# Patient Record
Sex: FEMALE | Race: WHITE | NOT HISPANIC OR LATINO | Employment: OTHER | URBAN - METROPOLITAN AREA
[De-identification: names, ages, dates, MRNs, and addresses within clinical notes are randomized per-mention and may not be internally consistent; named-entity substitution may affect disease eponyms.]

---

## 2018-03-01 ENCOUNTER — HOSPITAL ENCOUNTER (OUTPATIENT)
Dept: OUTPATIENT SERVICES | Facility: HOSPITAL | Age: 73
Discharge: HOME | End: 2018-03-01
Attending: RADIOLOGY | Admitting: RADIOLOGY

## 2018-03-20 ENCOUNTER — HOSPITAL ENCOUNTER (OUTPATIENT)
Dept: RADIATION ONCOLOGY | Facility: HOSPITAL | Age: 73
Setting detail: RADIATION/ONCOLOGY SERIES
Discharge: HOME | End: 2018-03-20
Attending: RADIOLOGY
Payer: COMMERCIAL

## 2018-03-20 VITALS — WEIGHT: 194 LBS

## 2018-03-20 PROBLEM — C50.912 MALIGNANT NEOPLASM OF LEFT BREAST IN FEMALE, ESTROGEN RECEPTOR POSITIVE (CMS/HCC): Status: ACTIVE | Noted: 2018-03-20

## 2018-03-20 PROBLEM — Z17.0 MALIGNANT NEOPLASM OF LEFT BREAST IN FEMALE, ESTROGEN RECEPTOR POSITIVE (CMS/HCC): Status: ACTIVE | Noted: 2018-03-20

## 2018-03-20 NOTE — PROGRESS NOTES
Patient ID:  Maria Elena Lowry is a 72 y.o. female.  Referring Physician:   Steven Galdamez DO  [address] [place] [id number]    Primary Care Provider:  Steven Galdamez DO    Problem List:  Patient Active Problem List    Diagnosis Date Noted   • Malignant neoplasm of left breast in female, estrogen receptor positive (CMS/HCC) (HCC) 03/20/2018        Cancer Staging Information:  Cancer Staging  No matching staging information was found for the patient.    Subjective      History of Present Illness:  The following portions of the patient's history were reviewed and updated as appropriate: allergies, current medications, past family history, past medical history, past social history, past surgical history and problem list.   HPI   Review of Systems - Oncology     Maria Elena returns for routine follow-up and is feeling relatively well.  She notes nice resolution of radiation side effects to the left breast.  Will be seeing Dr. Mcneal soon to initiate antiestrogen therapy    Objective      Physical Exam:  Vital Signs for this encounter:  Weight: 88 kg (194 lb) (03/20 1108)    Physical Exam   On exam, there is nice resolution of erythema and induration of the left breast.  Diffuse hyperpigmentation persists with areas of patchy dry desquamation.  Her cosmesis is excellent.    Performance Status:     PAIN ASSESSMENT:  Score    Location    Pain Intervention      LABS:  No results found for this or any previous visit (from the past 336 hour(s)).       Assessment/Plan   Maria Elena has recovered very nicely from her adjuvant breast radiation.    Diagnoses and Orders Associated With This Visit:  There are no diagnoses linked to this encounter.  TREATMENT PLAN SUMMARY:  [No treatment plan]    PO CHEMOTHERAPY:  Antiestrogen therapy per medical oncology         THERAPY PLAN:    Continue skin care protocol, initiate antiestrogen therapy, and hands healthy lifestyle, follow a program of close surveillance.  I will see Maria Elena in 9  months.  She is scheduled follow-up with her other physicians in the interim.

## 2018-05-17 DIAGNOSIS — Z85.3 HISTORY OF LEFT BREAST CANCER: Primary | ICD-10-CM

## 2018-05-25 ENCOUNTER — OFFICE VISIT (OUTPATIENT)
Dept: SURGERY | Facility: CLINIC | Age: 73
End: 2018-05-25
Payer: COMMERCIAL

## 2018-05-25 ENCOUNTER — HOSPITAL ENCOUNTER (OUTPATIENT)
Dept: RADIOLOGY | Facility: HOSPITAL | Age: 73
Discharge: HOME | End: 2018-05-25
Attending: SURGERY
Payer: COMMERCIAL

## 2018-05-25 VITALS
WEIGHT: 204 LBS | SYSTOLIC BLOOD PRESSURE: 176 MMHG | HEART RATE: 63 BPM | HEIGHT: 62 IN | DIASTOLIC BLOOD PRESSURE: 89 MMHG | BODY MASS INDEX: 37.54 KG/M2

## 2018-05-25 DIAGNOSIS — Z85.3 HISTORY OF BREAST CANCER: Primary | ICD-10-CM

## 2018-05-25 DIAGNOSIS — Z85.3 HISTORY OF LEFT BREAST CANCER: ICD-10-CM

## 2018-05-25 PROCEDURE — 99213 OFFICE O/P EST LOW 20 MIN: CPT | Performed by: SURGERY

## 2018-05-25 PROCEDURE — 77065 DX MAMMO INCL CAD UNI: CPT | Mod: LT

## 2018-05-25 ASSESSMENT — ENCOUNTER SYMPTOMS
ENDOCRINE NEGATIVE: 1
CARDIOVASCULAR NEGATIVE: 1
NEUROLOGICAL NEGATIVE: 1
CONSTITUTIONAL NEGATIVE: 1
GASTROINTESTINAL NEGATIVE: 1
MUSCULOSKELETAL NEGATIVE: 1
HEMATOLOGIC/LYMPHATIC NEGATIVE: 1
PSYCHIATRIC NEGATIVE: 1
RESPIRATORY NEGATIVE: 1
ALLERGIC/IMMUNOLOGIC NEGATIVE: 1

## 2018-05-25 NOTE — PROGRESS NOTES
Patient ID: Maria Elena Lowry                              : 1945  MRN: [id number]                                            Visit Date: 2018  Encounter Provider: Jose Luis Burgess  Referring Provider: Steven Galdamez DO Subjective   Chief Complaint: Breast Check (3 months follow up)    Maria Elena Lowry is a 72 y.o. old female presenting today for follow up for her recently treated left breast cancer.  She is status post left lumpectomy sentinel node biopsy for T1 N0 M0 stage I breast carcinoma.  She is on Arimidex.  She received whole breast radiation.  She had a mammogram today which we will require six-month follow-up on the left side..      Medications:   Current Outpatient Prescriptions:   •  cholecalciferol, vitamin D3, (cholecalciferol) 400 unit tablet tablet, Take by mouth., Disp: , Rfl:   •  multivitamin-min-iron-FA-vit K (MULTI FOR HER) 18 mg iron-600 mcg-40 mcg capsule, Take by mouth., Disp: , Rfl:     Past Medical History:  has a past medical history of Breast cancer (CMS/HCC) (HCC) and Estrogen receptor positive status (ER+).  Past Surgical History:  has a past surgical history that includes Breast biopsy; Breast lumpectomy (Left); [place] lymph node biopsy; Ankle ligament reconstruction (Right); and Tonsillectomy.  Social History:  reports that she has never smoked. She has never used smokeless tobacco. She reports that she drinks alcohol. She reports that she does not use drugs.  Family History: family history includes Hypertension in her father.  Allergies: is allergic to no known drug allergies.     Review of Systems   Constitutional: Negative.    HENT: Negative.    Respiratory: Negative.    Cardiovascular: Negative.    Gastrointestinal: Negative.    Endocrine: Negative.    Genitourinary: Negative.    Musculoskeletal: Negative.    Skin: Negative.    Allergic/Immunologic: Negative.    Neurological: Negative.    Hematological: Negative.    Psychiatric/Behavioral: Negative.      The  "following have been reviewed and updated as appropriate in this visit:         Objective   Vitals: BP (!) 176/89 (BP Location: Right forearm, Patient Position: Sitting)   Pulse 63   Ht 1.575 m (5' 2\")   Wt 92.5 kg (204 lb)   Breastfeeding? No   BMI 37.31 kg/m²   Physical Exam  Physical Exam   Constitutional: She is oriented to person, place, and time. She appears well-developed and well-nourished.   Eyes: Pupils are equal, round, and reactive to light.   Neck: Normal range of motion. Neck supple.   Cardiovascular: Normal rate, regular rhythm, normal heart sounds and intact distal pulses.    Pulmonary/Chest: Effort normal and breath sounds normal.   Abdominal: Soft. Bowel sounds are normal.   Neurological: She is alert and oriented to person, place, and time.   Skin: Skin is warm and dry.   Psychiatric: She has a normal mood and affect. Her behavior is normal. Judgment and thought content normal.   Breasts- there are no masses in either breast, no retractions skin or nipple, no nipple discharge could be demonstrated, there is no evidence of axillary supraclavicular adenopathy.       Assessment/Plan   Problem List Items Addressed This Visit     None      Visit Diagnoses     History of breast cancer    -  Primary        Status post left lumpectomy sentinel node biopsy.  Clinically ANNA at 7 months.  I will see her back in 3 months time.  She will require a left mammogram in November  Return in about 3 months (around 8/25/2018).       Anna Burgess MD  "

## 2018-08-30 ENCOUNTER — OFFICE VISIT (OUTPATIENT)
Dept: SURGERY | Facility: CLINIC | Age: 73
End: 2018-08-30
Payer: COMMERCIAL

## 2018-08-30 ENCOUNTER — TELEPHONE (OUTPATIENT)
Dept: SURGERY | Facility: CLINIC | Age: 73
End: 2018-08-30

## 2018-08-30 VITALS
BODY MASS INDEX: 37.54 KG/M2 | SYSTOLIC BLOOD PRESSURE: 166 MMHG | WEIGHT: 204 LBS | HEART RATE: 70 BPM | HEIGHT: 62 IN | DIASTOLIC BLOOD PRESSURE: 90 MMHG | TEMPERATURE: 98.3 F

## 2018-08-30 DIAGNOSIS — C50.912 MALIGNANT NEOPLASM OF LEFT FEMALE BREAST, UNSPECIFIED ESTROGEN RECEPTOR STATUS, UNSPECIFIED SITE OF BREAST (CMS/HCC): Primary | ICD-10-CM

## 2018-08-30 DIAGNOSIS — C50.912 MALIGNANT NEOPLASM OF LEFT BREAST IN FEMALE, ESTROGEN RECEPTOR POSITIVE, UNSPECIFIED SITE OF BREAST (CMS/HCC): Primary | ICD-10-CM

## 2018-08-30 DIAGNOSIS — Z17.0 MALIGNANT NEOPLASM OF LEFT BREAST IN FEMALE, ESTROGEN RECEPTOR POSITIVE, UNSPECIFIED SITE OF BREAST (CMS/HCC): Primary | ICD-10-CM

## 2018-08-30 DIAGNOSIS — Z85.3 HISTORY OF BREAST CANCER: ICD-10-CM

## 2018-08-30 PROCEDURE — 99213 OFFICE O/P EST LOW 20 MIN: CPT | Performed by: SURGERY

## 2018-08-30 RX ORDER — ACETAMINOPHEN AND CODEINE PHOSPHATE 300; 30 MG/1; MG/1
TABLET ORAL AS NEEDED
COMMUNITY
Start: 2017-11-03 | End: 2020-11-25 | Stop reason: ENTERED-IN-ERROR

## 2018-08-30 ASSESSMENT — ENCOUNTER SYMPTOMS
CARDIOVASCULAR NEGATIVE: 1
ALLERGIC/IMMUNOLOGIC NEGATIVE: 1
HEMATOLOGIC/LYMPHATIC NEGATIVE: 1
GASTROINTESTINAL NEGATIVE: 1
ENDOCRINE NEGATIVE: 1
RESPIRATORY NEGATIVE: 1
NEUROLOGICAL NEGATIVE: 1
MUSCULOSKELETAL NEGATIVE: 1
PSYCHIATRIC NEGATIVE: 1
CONSTITUTIONAL NEGATIVE: 1

## 2018-08-30 NOTE — PROGRESS NOTES
Patient ID: Maria Elena Lowry                              : 1945  MRN: [id number]                                            Visit Date: 2018  Encounter Provider: Jose Luis Burgess  Referring Provider: No ref. provider found    Subjective   Chief Complaint: Follow-up (Hx of breast cancer)    Maria Elena Lowry is a 73 y.o. old female presenting today for follow-up of her recently treated left breast cancer.  Status post left lumpectomy and radiation for a T1 N0 M0 stage I infiltrating ductal breast carcinoma.. Is here for an evaluation.  She had a mammogram 3 months ago was category 3 which require follow-up left mammogram in 3 months.  She has no major complaints at this time.     Medications:   Current Outpatient Prescriptions:   •  acetaminophen-codeine (TYLENOL #3) 300-30 mg per tablet, as needed., Disp: , Rfl:   •  cholecalciferol, vitamin D3, (cholecalciferol) 400 unit tablet tablet, Take by mouth., Disp: , Rfl:   •  multivitamin-min-iron-FA-vit K (MULTI FOR HER) 18 mg iron-600 mcg-40 mcg capsule, Take by mouth., Disp: , Rfl:     Past Medical History:  has a past medical history of Breast cancer (CMS/HCC) (HCC) and Estrogen receptor positive status (ER+).  Past Surgical History:  has a past surgical history that includes Breast biopsy; Breast lumpectomy (Left); [place] lymph node biopsy; Ankle ligament reconstruction (Right); and Tonsillectomy.  Social History:  reports that she has never smoked. She has never used smokeless tobacco. She reports that she drinks alcohol. She reports that she does not use drugs.  Family History: family history includes Hypertension in her father.  Allergies: is allergic to no known drug allergies.     Review of Systems   Constitutional: Negative.    HENT: Negative.    Respiratory: Negative.    Cardiovascular: Negative.    Gastrointestinal: Negative.    Endocrine: Negative.    Genitourinary: Negative.    Musculoskeletal: Negative.    Skin: Negative.   "  Allergic/Immunologic: Negative.    Neurological: Negative.    Hematological: Negative.    Psychiatric/Behavioral: Negative.      The following have been reviewed and updated as appropriate in this visit:         Objective   Vitals: BP (!) 166/90 (BP Location: Right forearm, Patient Position: Sitting)   Pulse 70   Temp 36.8 °C (98.3 °F) (Temporal)   Ht 1.575 m (5' 2\")   Wt 92.5 kg (204 lb)   BMI 37.31 kg/m²   Physical Exam      Physical Exam   Constitutional: She is oriented to person, place, and time. She appears well-developed and well-nourished.   Eyes: Pupils are equal, round, and reactive to light.   Neck: Normal range of motion. Neck supple.   Cardiovascular: Normal rate, regular rhythm, normal heart sounds and intact distal pulses.    Pulmonary/Chest: Effort normal and breath sounds normal.   Abdominal: Soft. Bowel sounds are normal.   Neurological: She is alert and oriented to person, place, and time.   Skin: Skin is warm and dry.   Psychiatric: She has a normal mood and affect. Her behavior is normal. Judgment and thought content normal.   Breasts- there are no masses in either breast, no retractions skin or nipple, no nipple discharge could be demonstrated, there is no evidence of axillary supraclavicular adenopathy.    Assessment/Plan   Problem List Items Addressed This Visit     Malignant neoplasm of left breast in female, estrogen receptor positive (CMS/HCC) (HCC) - Primary      Other Visit Diagnoses     History of breast cancer            She had a normal physical exam.  She will get a left mammogram in 3 months time and I will see her back at that time.  She is clinically ANNA at 10 months.  Return in about 3 months (around 11/30/2018).       Anna Burgess MD  "

## 2018-11-15 ENCOUNTER — LAB REQUISITION (OUTPATIENT)
Dept: LAB | Facility: HOSPITAL | Age: 73
End: 2018-11-15
Attending: INTERNAL MEDICINE
Payer: COMMERCIAL

## 2018-11-15 DIAGNOSIS — C50.912 MALIGNANT NEOPLASM OF LEFT FEMALE BREAST (CMS/HCC): ICD-10-CM

## 2018-11-15 LAB
BASOPHILS # BLD: 0.08 K/UL (ref 0.01–0.1)
BASOPHILS NFR BLD: 1 %
DIFFERENTIAL METHOD BLD: NORMAL
EOSINOPHIL # BLD: 0.13 K/UL (ref 0.04–0.36)
EOSINOPHIL NFR BLD: 1.5 %
ERYTHROCYTE [DISTWIDTH] IN BLOOD BY AUTOMATED COUNT: 12.7 % (ref 11.7–14.4)
HCT VFR BLDCO AUTO: 38.1 % (ref 35–45)
HGB BLD-MCNC: 12.6 G/DL (ref 11.8–15.7)
IMM GRANULOCYTES # BLD AUTO: 0.01 K/UL (ref 0–0.08)
IMM GRANULOCYTES NFR BLD AUTO: 0.1 %
LYMPHOCYTES # BLD: 1.99 K/UL (ref 1.2–3.5)
LYMPHOCYTES NFR BLD: 23.6 %
MCH RBC QN AUTO: 30.6 PG (ref 28–33.2)
MCHC RBC AUTO-ENTMCNC: 33.1 G/DL (ref 32.2–35.5)
MCV RBC AUTO: 92.5 FL (ref 83–98)
MONOCYTES # BLD: 0.69 K/UL (ref 0.28–0.8)
MONOCYTES NFR BLD: 8.2 %
NEUTROPHILS # BLD: 5.52 K/UL (ref 1.7–7)
NEUTS SEG NFR BLD: 65.6 %
NRBC BLD-RTO: 0 %
PDW BLD AUTO: 9.2 FL (ref 9.4–12.3)
PLATELET # BLD AUTO: 433 K/UL (ref 150–369)
RBC # BLD AUTO: 4.12 M/UL (ref 3.93–5.22)
WBC # BLD AUTO: 8.42 K/UL (ref 3.8–10.5)

## 2018-11-15 PROCEDURE — 85025 COMPLETE CBC W/AUTO DIFF WBC: CPT | Performed by: INTERNAL MEDICINE

## 2018-11-15 PROCEDURE — 36415 COLL VENOUS BLD VENIPUNCTURE: CPT | Performed by: INTERNAL MEDICINE

## 2018-11-29 ENCOUNTER — HOSPITAL ENCOUNTER (OUTPATIENT)
Dept: RADIOLOGY | Facility: HOSPITAL | Age: 73
Discharge: HOME | End: 2018-11-29
Attending: SURGERY
Payer: COMMERCIAL

## 2018-11-29 DIAGNOSIS — C50.912 MALIGNANT NEOPLASM OF LEFT FEMALE BREAST, UNSPECIFIED ESTROGEN RECEPTOR STATUS, UNSPECIFIED SITE OF BREAST (CMS/HCC): ICD-10-CM

## 2018-11-29 DIAGNOSIS — Z17.0 MALIGNANT NEOPLASM OF LEFT BREAST IN FEMALE, ESTROGEN RECEPTOR POSITIVE, UNSPECIFIED SITE OF BREAST (CMS/HCC): Primary | ICD-10-CM

## 2018-11-29 DIAGNOSIS — C50.912 MALIGNANT NEOPLASM OF LEFT BREAST IN FEMALE, ESTROGEN RECEPTOR POSITIVE, UNSPECIFIED SITE OF BREAST (CMS/HCC): Primary | ICD-10-CM

## 2018-11-29 PROCEDURE — 77066 DX MAMMO INCL CAD BI: CPT

## 2018-12-11 ENCOUNTER — OFFICE VISIT (OUTPATIENT)
Dept: SURGERY | Facility: CLINIC | Age: 73
End: 2018-12-11
Payer: COMMERCIAL

## 2018-12-11 VITALS
HEART RATE: 70 BPM | SYSTOLIC BLOOD PRESSURE: 160 MMHG | WEIGHT: 204 LBS | TEMPERATURE: 97.8 F | BODY MASS INDEX: 37.54 KG/M2 | DIASTOLIC BLOOD PRESSURE: 83 MMHG | HEIGHT: 62 IN

## 2018-12-11 DIAGNOSIS — Z85.3 HISTORY OF BREAST CANCER: Primary | ICD-10-CM

## 2018-12-11 PROCEDURE — 99213 OFFICE O/P EST LOW 20 MIN: CPT | Performed by: SURGERY

## 2018-12-11 RX ORDER — ANASTROZOLE 1 MG/1
1 TABLET ORAL DAILY
Refills: 0 | Status: ON HOLD | COMMUNITY
Start: 2018-11-13 | End: 2021-03-16 | Stop reason: SDUPTHER

## 2018-12-11 ASSESSMENT — ENCOUNTER SYMPTOMS
NEUROLOGICAL NEGATIVE: 1
MUSCULOSKELETAL NEGATIVE: 1
PSYCHIATRIC NEGATIVE: 1
CONSTITUTIONAL NEGATIVE: 1
HEMATOLOGIC/LYMPHATIC NEGATIVE: 1
ALLERGIC/IMMUNOLOGIC NEGATIVE: 1
GASTROINTESTINAL NEGATIVE: 1
CARDIOVASCULAR NEGATIVE: 1
RESPIRATORY NEGATIVE: 1
ENDOCRINE NEGATIVE: 1

## 2018-12-11 NOTE — PROGRESS NOTES
Patient ID: Maria Elena Lowry                              : 1945  MRN: [id number]                                            Visit Date: 2018  Encounter Provider: Jose Luis Burgess  Referring Provider: No ref. provider found    Subjective   Chief Complaint: Breast Check (4 months f/u - mammigram done on 18)    Maria Elena Lowry is a 73 y.o. old female presenting today for previous history of left breast cancer.  She is status post left lumpectomy and sentinel node biopsy.  1N0M0 stage I intraductal breast carcinoma.  She received whole breast radiation and is currently on Arimidex.  She did bilateral mammogram 2 weeks ago which showed nothing high risk and no major complaints this time.     Medications:   Current Outpatient Prescriptions:   •  anastrozole (ARIMIDEX) 1 mg tablet, , Disp: , Rfl: 0  •  cholecalciferol, vitamin D3, (cholecalciferol) 400 unit tablet tablet, Take by mouth., Disp: , Rfl:   •  multivitamin-min-iron-FA-vit K (MULTI FOR HER) 18 mg iron-600 mcg-40 mcg capsule, Take by mouth., Disp: , Rfl:   •  acetaminophen-codeine (TYLENOL #3) 300-30 mg per tablet, as needed., Disp: , Rfl:     Past Medical History:  has a past medical history of Breast cancer (CMS/HCC) (HCC) and Estrogen receptor positive status (ER+).  Past Surgical History:  has a past surgical history that includes Breast biopsy; Breast lumpectomy (Left); [place] lymph node biopsy; Ankle ligament reconstruction (Right); and Tonsillectomy.  Social History:  reports that she has never smoked. She has never used smokeless tobacco. She reports that she drinks alcohol. She reports that she does not use drugs.  Family History: family history includes Hypertension in her father.  Allergies: is allergic to no known drug allergies.     Review of Systems   Constitutional: Negative.    HENT: Negative.    Respiratory: Negative.    Cardiovascular: Negative.    Gastrointestinal: Negative.    Endocrine: Negative.    Genitourinary:  "Negative.    Musculoskeletal: Negative.    Skin: Negative.    Allergic/Immunologic: Negative.    Neurological: Negative.    Hematological: Negative.    Psychiatric/Behavioral: Negative.      The following have been reviewed and updated as appropriate in this visit:         Objective   Vitals: BP (!) 160/83 (BP Location: Left forearm, Patient Position: Sitting)   Pulse 70   Temp 36.6 °C (97.8 °F) (Temporal)   Ht 1.575 m (5' 2\")   Wt 92.5 kg (204 lb)   BMI 37.31 kg/m²   Physical Exam  Physical Exam   Constitutional: She is oriented to person, place, and time. She appears well-developed and well-nourished.   Eyes: Pupils are equal, round, and reactive to light.   Neck: Normal range of motion. Neck supple.   Cardiovascular: Normal rate, regular rhythm, normal heart sounds and intact distal pulses.    Pulmonary/Chest: Effort normal and breath sounds normal.   Abdominal: Soft. Bowel sounds are normal.   Neurological: She is alert and oriented to person, place, and time.   Skin: Skin is warm and dry.   Psychiatric: She has a normal mood and affect. Her behavior is normal. Judgment and thought content normal.   Breasts- there are no masses in either breast, no retractions skin or nipple, no nipple discharge could be demonstrated, there is no evidence of axillary supraclavicular adenopathy.       Assessment/Plan   Problem List Items Addressed This Visit     None      Visit Diagnoses     History of breast cancer    -  Primary        She had a normal mammogram annual physical exam.  Clinically in the 1 year.  As far as follow-up months of breast examination by physician twice a repeat in 1 years time.  Return in about 3 months (around 3/11/2019).       Jose Luis Burgess MD  "

## 2018-12-20 ENCOUNTER — HOSPITAL ENCOUNTER (OUTPATIENT)
Dept: RADIATION ONCOLOGY | Facility: HOSPITAL | Age: 73
Setting detail: RADIATION/ONCOLOGY SERIES
Discharge: HOME | End: 2018-12-20
Attending: RADIOLOGY
Payer: COMMERCIAL

## 2018-12-20 VITALS — WEIGHT: 205 LBS | BODY MASS INDEX: 37.49 KG/M2

## 2018-12-20 DIAGNOSIS — Z17.0 MALIGNANT NEOPLASM OF LEFT BREAST IN FEMALE, ESTROGEN RECEPTOR POSITIVE, UNSPECIFIED SITE OF BREAST (CMS/HCC): Primary | ICD-10-CM

## 2018-12-20 DIAGNOSIS — C50.912 MALIGNANT NEOPLASM OF LEFT BREAST IN FEMALE, ESTROGEN RECEPTOR POSITIVE, UNSPECIFIED SITE OF BREAST (CMS/HCC): Primary | ICD-10-CM

## 2018-12-20 ASSESSMENT — PAIN SCALES - GENERAL: PAINLEVEL: 0-NO PAIN

## 2018-12-20 NOTE — PROGRESS NOTES
Consult Note      Steven Galdamez, Steven Osorio, DO  Patient Care Team     Relationship Specialty Notifications Start End   Steven Galdamez, DO PCP - General   12/30/17    Celi Liao MD Radiation Oncologist Radiation Oncology  11/28/18        Subjective     Maria Elena Lowry is a 73 y.o. female who was referred for Cancer Staging  No matching staging information was found for the patient.        HPI: Maria Elena returns for follow up for treatment of her left breast cancer. She is taking Arimidex which she is tolerating well however she blames weight gain on the medicine.     She had a mammogram on 5/25 that showed only post op changes in her left breast. She saw Dr. Burgess at that time. She saw Dr. Mcneal last month. She takes calcium with vitamin D. 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     Medical History:   Past Medical History:   Diagnosis Date   • Breast cancer (CMS/HCC) (HCC)     Left   • Estrogen receptor positive status (ER+)        Surgical History:   Past Surgical History:   Procedure Laterality Date   • ANKLE LIGAMENT RECONSTRUCTION Right    • BREAST BIOPSY     • BREAST LUMPECTOMY Left    • SENTINEL LYMPH NODE BIOPSY     • TONSILLECTOMY          Allergies: No known drug allergies      Current Outpatient Prescriptions:   •  acetaminophen-codeine (TYLENOL #3) 300-30 mg per tablet, as needed., Disp: , Rfl:   •  anastrozole (ARIMIDEX) 1 mg tablet, , Disp: , Rfl: 0  •  cholecalciferol, vitamin D3, (cholecalciferol) 400 unit tablet tablet, Take by mouth., Disp: , Rfl:   •  multivitamin-min-iron-FA-vit K (MULTI FOR HER) 18 mg iron-600 mcg-40 mcg capsule, Take by mouth., Disp: , Rfl:      Social History:   Social History     Social History   • Marital status: Single     Spouse name: N/A   • Number of children: N/A   • Years of education: N/A     Social History Main Topics   • Smoking status: Never Smoker   • Smokeless tobacco: Never Used   • Alcohol use Yes   • Drug use: No   • Sexual  activity: Not on file     Other Topics Concern   • Not on file     Social History Narrative   • No narrative on file       Family History:   Family History   Problem Relation Age of Onset   • Hypertension Father        Objective       Physical Exam   General appearance: alert, appears stated age and cooperative  Head: normocephalic, without obvious abnormality, atraumatic  Chest wall: The left breast is significant for a well healing incision with minimal associated smooth fibrosis.  She has an excellent cosmesis.  The right breast is negative.  She does have some pigmented moles throughout, but including in the breast region.  Extremities: extremities normal, warm and well-perfused; no cyanosis, clubbing, or edema  Lymph nodes: Axillary, cervical and supraclavicular nodes normal.  Neurologic: Grossly normal      CBC Results       11/15/18 10/27/17                       1155 0837          WBC 8.42 8.90          RBC 4.12 4.23          HGB 12.6 12.8          HCT 38.1 39.1          MCV 92.5 92.4          MCH 30.6 30.3          MCHC 33.1 32.7           (H) 511 (H)                     BMP Results       10/27/17                          0837                      K 4.1           Cl 104           CO2 26           Glucose 117 (H)           BUN 14           Creatinine 0.7           Calcium 9.7           Anion Gap 9                       PT/PTT Results     No lab values to display.      UA Results     No lab values to display.      Lactate Results     No lab values to display.            Bi Diagnostic Mammogram Bilateral    Result Date: 11/29/2018  Narrative: CLINICAL HISTORY: 73-year-old female with a history of left breast cancer treated last year.  Mother also had breast cancer. COMMENT: Full field digital bilateral combination mammography and breast tomosynthesis was performed.  Comparisons date back to 11/22/2013. There are scattered areas of fibroglandular density (breast density is type B).  There are  stable posttreatment changes in the left breast No suspicious mass is seen. No suspicious calcifications or indirect signs of malignancy are seen. Computer assisted detection was utilized during the interpretation of this examination.     Impression: IMPRESSION: No evidence of malignancy. Annual mammography is recommended. The patient will be sent results by mail. FINAL ASSESSMENT BIRADS CATEGORY 2: BENIGN FINDING, (NOR NC D) SCATTERED The patient's information has been entered into our automated reminder system with a target due date for her next mammogram. Mammography reports should be evaluated with the following in mind: 1.  A report that is negative for malignancy should not delay biopsy if there is a dominant or clinically suspicious mass.  Some cancers are not visualized by mammography. 2.  In dense breasts, an underlying mass may be obscured.        Pathology Results     ** No results found for the last 720 hours. **             Impression/Plan    In summary, Maria Elena is doing quite well without any evidence of active breast disease on antiestrogen therapy and trying to lead a healthy lifestyle.  Her radiation breast and skin changes have resolved very nicely.  She will have her skin moles checked by Dr. Larson.  She will see Dr. Mcneal and Jenifer in follow-up.  I will see her on an as-needed basis moving forward.

## 2019-03-22 ENCOUNTER — OFFICE VISIT (OUTPATIENT)
Dept: SURGERY | Facility: CLINIC | Age: 74
End: 2019-03-22
Payer: COMMERCIAL

## 2019-03-22 VITALS — BODY MASS INDEX: 37.73 KG/M2 | WEIGHT: 205 LBS | HEIGHT: 62 IN

## 2019-03-22 DIAGNOSIS — Z85.3 HISTORY OF BREAST CANCER: Primary | ICD-10-CM

## 2019-03-22 PROCEDURE — 99213 OFFICE O/P EST LOW 20 MIN: CPT | Performed by: SURGERY

## 2019-03-22 ASSESSMENT — ENCOUNTER SYMPTOMS
PSYCHIATRIC NEGATIVE: 1
ALLERGIC/IMMUNOLOGIC NEGATIVE: 1
ENDOCRINE NEGATIVE: 1
RESPIRATORY NEGATIVE: 1
CONSTITUTIONAL NEGATIVE: 1
GASTROINTESTINAL NEGATIVE: 1
NEUROLOGICAL NEGATIVE: 1
CARDIOVASCULAR NEGATIVE: 1
HEMATOLOGIC/LYMPHATIC NEGATIVE: 1
MUSCULOSKELETAL NEGATIVE: 1

## 2019-03-22 NOTE — PROGRESS NOTES
Patient ID: Maria Elena Lowry                              : 1945  MRN: [id number]                                            Visit Date: 3/22/2019  Encounter Provider: Jose Luis Burgess  Referring Provider: No ref. provider found    Subjective   Chief Complaint: Follow-up (Breast Check)    Maria Elena Lowry is a 73 y.o. old female presenting today for previously treated left breast cancer.  She is status post left lumpectomy sentinel biopsy for T1 N0 M0 stage I infiltrating ductal breast carcinoma.  She received whole breast radiation is currently on a REM index.  Her last mammogram was in May which showed nothing high risk and at this time.     Medications:   Current Outpatient Prescriptions:   •  acetaminophen-codeine (TYLENOL #3) 300-30 mg per tablet, as needed., Disp: , Rfl:   •  anastrozole (ARIMIDEX) 1 mg tablet, , Disp: , Rfl: 0  •  cholecalciferol, vitamin D3, (cholecalciferol) 400 unit tablet tablet, Take by mouth., Disp: , Rfl:   •  multivitamin-min-iron-FA-vit K (MULTI FOR HER) 18 mg iron-600 mcg-40 mcg capsule, Take by mouth., Disp: , Rfl:     Past Medical History:  has a past medical history of Breast cancer (CMS/HCC) (HCC) and Estrogen receptor positive status (ER+).  Past Surgical History:  has a past surgical history that includes Breast biopsy; Breast lumpectomy (Left); [place] lymph node biopsy; Ankle ligament reconstruction (Right); and Tonsillectomy.  Social History:  reports that she has never smoked. She has never used smokeless tobacco. She reports that she drinks alcohol. She reports that she does not use drugs.  Family History: family history includes Hypertension in her father.  Allergies: is allergic to no known drug allergies.     Review of Systems   Constitutional: Negative.    HENT: Negative.    Respiratory: Negative.    Cardiovascular: Negative.    Gastrointestinal: Negative.    Endocrine: Negative.    Genitourinary: Negative.    Musculoskeletal: Negative.    Skin: Negative.   "  Allergic/Immunologic: Negative.    Neurological: Negative.    Hematological: Negative.    Psychiatric/Behavioral: Negative.      The following have been reviewed and updated as appropriate in this visit:         Objective   Vitals: Ht 1.575 m (5' 2\")   Wt 93 kg (205 lb)   BMI 37.49 kg/m²   Physical Exam      Physical Exam   Constitutional: She is oriented to person, place, and time. She appears well-developed and well-nourished.   Eyes: Pupils are equal, round, and reactive to light.   Neck: Normal range of motion. Neck supple.   Cardiovascular: Normal rate, regular rhythm, normal heart sounds and intact distal pulses.    Pulmonary/Chest: Effort normal and breath sounds normal.   Abdominal: Soft. Bowel sounds are normal.   Neurological: She is alert and oriented to person, place, and time.   Skin: Skin is warm and dry.   Psychiatric: She has a normal mood and affect. Her behavior is normal. Judgment and thought content normal.   Breasts- there are no masses in either breast, no retractions skin or nipple, no nipple discharge could be demonstrated, there is no evidence of axillary supraclavicular adenopathy.  Assessment/Plan   Problem List Items Addressed This Visit     None      Visit Diagnoses     History of breast cancer    -  Primary        She had normal mammogram in May as well as normal physical exam.  S.  As well as follow-up months of breast exams exam by physician twice a year and a repeat mammogram 1 years time.  Return in about 3 months (around 6/22/2019).       Jose Luis Burgess MD  "

## 2019-03-25 ENCOUNTER — TELEPHONE (OUTPATIENT)
Dept: SURGERY | Facility: CLINIC | Age: 74
End: 2019-03-25

## 2019-03-25 DIAGNOSIS — Z85.3 HISTORY OF BREAST CANCER: ICD-10-CM

## 2019-03-25 DIAGNOSIS — Z80.3 FAMILY HISTORY OF BREAST CANCER: Primary | ICD-10-CM

## 2019-06-28 ENCOUNTER — OFFICE VISIT (OUTPATIENT)
Dept: SURGERY | Facility: CLINIC | Age: 74
End: 2019-06-28
Payer: COMMERCIAL

## 2019-06-28 VITALS
SYSTOLIC BLOOD PRESSURE: 169 MMHG | HEART RATE: 66 BPM | BODY MASS INDEX: 37.73 KG/M2 | DIASTOLIC BLOOD PRESSURE: 82 MMHG | WEIGHT: 205 LBS | HEIGHT: 62 IN

## 2019-06-28 DIAGNOSIS — Z85.3 HISTORY OF BREAST CANCER: Primary | ICD-10-CM

## 2019-06-28 PROCEDURE — 99213 OFFICE O/P EST LOW 20 MIN: CPT | Performed by: SURGERY

## 2019-06-28 ASSESSMENT — ENCOUNTER SYMPTOMS
RESPIRATORY NEGATIVE: 1
NEUROLOGICAL NEGATIVE: 1
MUSCULOSKELETAL NEGATIVE: 1
CONSTITUTIONAL NEGATIVE: 1
ENDOCRINE NEGATIVE: 1
HEMATOLOGIC/LYMPHATIC NEGATIVE: 1
GASTROINTESTINAL NEGATIVE: 1
CARDIOVASCULAR NEGATIVE: 1
PSYCHIATRIC NEGATIVE: 1
ALLERGIC/IMMUNOLOGIC NEGATIVE: 1

## 2019-06-28 NOTE — PROGRESS NOTES
Patient ID: Maria Elena Lowry                              : 1945  MRN: [id number]                                            Visit Date: 2019  Encounter Provider: Jose Luis Burgess  Referring Provider: No ref. provider found    Subjective   Chief Complaint: Breast Check (yearly check-mammo done )    Maria Elena Lowry is a 74 y.o. old female presenting today for follow-up of her previously treated left breast cancer.  She is status post left lumpectomy sentinel node biopsy for T1 N0 M0 stage I infiltrating ductal breast carcinoma.  She received whole breast radiation and is currently on a REM index.  Her last bilateral mammogram was in November which showed a this time..      Medications:   Current Outpatient Prescriptions:   •  acetaminophen-codeine (TYLENOL #3) 300-30 mg per tablet, as needed., Disp: , Rfl:   •  anastrozole (ARIMIDEX) 1 mg tablet, , Disp: , Rfl: 0  •  cholecalciferol, vitamin D3, (cholecalciferol) 400 unit tablet tablet, Take by mouth., Disp: , Rfl:   •  multivitamin-min-iron-FA-vit K (MULTI FOR HER) 18 mg iron-600 mcg-40 mcg capsule, Take by mouth., Disp: , Rfl:     Past Medical History:  has a past medical history of Breast cancer (CMS/HCC) (HCC) and Estrogen receptor positive status (ER+).  Past Surgical History:  has a past surgical history that includes Breast biopsy; Breast lumpectomy (Left); [place] lymph node biopsy; Ankle ligament reconstruction (Right); and Tonsillectomy.  Social History:  reports that she has never smoked. She has never used smokeless tobacco. She reports that she drinks alcohol. She reports that she does not use drugs.  Family History: family history includes Hypertension in her father.  Allergies: is allergic to no known drug allergies.     Review of Systems   Constitutional: Negative.    HENT: Negative.    Respiratory: Negative.    Cardiovascular: Negative.    Gastrointestinal: Negative.    Endocrine: Negative.    Genitourinary: Negative.    Musculoskeletal:  "Negative.    Skin: Negative.    Allergic/Immunologic: Negative.    Neurological: Negative.    Hematological: Negative.    Psychiatric/Behavioral: Negative.      The following have been reviewed and updated as appropriate in this visit:         Objective   Vitals: BP (!) 169/82   Pulse 66   Ht 1.575 m (5' 2\")   Wt 93 kg (205 lb)   BMI 37.49 kg/m²   Physical Exam      Physical Exam   Constitutional: She is oriented to person, place, and time. She appears well-developed and well-nourished.   Eyes: Pupils are equal, round, and reactive to light.   Neck: Normal range of motion. Neck supple.   Cardiovascular: Normal rate, regular rhythm, normal heart sounds and intact distal pulses.    Pulmonary/Chest: Effort normal and breath sounds normal.   Abdominal: Soft. Bowel sounds are normal.   Neurological: She is alert and oriented to person, place, and time.   Skin: Skin is warm and dry.   Psychiatric: She has a normal mood and affect. Her behavior is normal. Judgment and thought content normal.   Breasts- there are no masses in either breast, no retractions skin or nipple, no nipple discharge could be demonstrated, there is no evidence of axillary supraclavicular adenopathy.    Assessment/Plan   Problem List Items Addressed This Visit     None      Visit Diagnoses     History of breast cancer    -  Primary        She had a normal mammogram in November normal physical exam.  He is clinically ANNA 19 months.  I will see her back in 4 months time with a mammogram.  No Follow-up on file.       Anna Burgess MD  "

## 2019-08-02 ENCOUNTER — TRANSCRIBE ORDERS (OUTPATIENT)
Dept: SCHEDULING | Age: 74
End: 2019-08-02

## 2019-08-02 DIAGNOSIS — C50.912 MALIGNANT NEOPLASM OF LEFT FEMALE BREAST (CMS/HCC): Primary | ICD-10-CM

## 2019-08-05 ENCOUNTER — HOSPITAL ENCOUNTER (OUTPATIENT)
Dept: RADIOLOGY | Facility: HOSPITAL | Age: 74
Discharge: HOME | End: 2019-08-05
Attending: INTERNAL MEDICINE
Payer: COMMERCIAL

## 2019-08-05 DIAGNOSIS — C50.912 MALIGNANT NEOPLASM OF LEFT FEMALE BREAST (CMS/HCC): ICD-10-CM

## 2019-08-05 PROCEDURE — 77080 DXA BONE DENSITY AXIAL: CPT

## 2019-12-03 ENCOUNTER — HOSPITAL ENCOUNTER (OUTPATIENT)
Dept: RADIOLOGY | Facility: HOSPITAL | Age: 74
Discharge: HOME | End: 2019-12-03
Attending: SURGERY
Payer: COMMERCIAL

## 2019-12-03 DIAGNOSIS — C50.912 MALIGNANT NEOPLASM OF LEFT BREAST IN FEMALE, ESTROGEN RECEPTOR POSITIVE, UNSPECIFIED SITE OF BREAST (CMS/HCC): ICD-10-CM

## 2019-12-03 DIAGNOSIS — Z17.0 MALIGNANT NEOPLASM OF LEFT BREAST IN FEMALE, ESTROGEN RECEPTOR POSITIVE, UNSPECIFIED SITE OF BREAST (CMS/HCC): ICD-10-CM

## 2019-12-03 PROCEDURE — G0279 TOMOSYNTHESIS, MAMMO: HCPCS

## 2019-12-06 ENCOUNTER — OFFICE VISIT (OUTPATIENT)
Dept: SURGERY | Facility: CLINIC | Age: 74
End: 2019-12-06
Payer: COMMERCIAL

## 2019-12-06 VITALS
DIASTOLIC BLOOD PRESSURE: 93 MMHG | SYSTOLIC BLOOD PRESSURE: 176 MMHG | HEART RATE: 75 BPM | WEIGHT: 205 LBS | BODY MASS INDEX: 37.73 KG/M2 | HEIGHT: 62 IN

## 2019-12-06 DIAGNOSIS — Z85.3 HISTORY OF BREAST CANCER: Primary | ICD-10-CM

## 2019-12-06 PROCEDURE — 99213 OFFICE O/P EST LOW 20 MIN: CPT | Performed by: SURGERY

## 2019-12-06 ASSESSMENT — ENCOUNTER SYMPTOMS
ENDOCRINE NEGATIVE: 1
RESPIRATORY NEGATIVE: 1
HEMATOLOGIC/LYMPHATIC NEGATIVE: 1
CONSTITUTIONAL NEGATIVE: 1
PSYCHIATRIC NEGATIVE: 1
MUSCULOSKELETAL NEGATIVE: 1
CARDIOVASCULAR NEGATIVE: 1
ALLERGIC/IMMUNOLOGIC NEGATIVE: 1
NEUROLOGICAL NEGATIVE: 1
GASTROINTESTINAL NEGATIVE: 1

## 2019-12-06 NOTE — PROGRESS NOTES
Patient ID: Maria Elena Lowry                              : 1945  MRN: [id number]                                            Visit Date: 2019  Encounter Provider: Jose Luis Burgess  Referring Provider: No ref. provider found    Subjective   Chief Complaint: 6 month check up (mammo 12-3)    Maria Elena Lowry is a 74 y.o. old female presenting today for previously treated left breast cancer.  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  She received whole breast radiation.  She is currently on an aromatase inhibitor.  She is here for an evaluation.  Days ago which showed nothing high risk and she has no major complaints at this time.     Medications:   Current Outpatient Medications:   •  acetaminophen-codeine (TYLENOL #3) 300-30 mg per tablet, as needed., Disp: , Rfl:   •  anastrozole (ARIMIDEX) 1 mg tablet, , Disp: , Rfl: 0  •  cholecalciferol, vitamin D3, (cholecalciferol) 400 unit tablet tablet, Take by mouth., Disp: , Rfl:   •  multivitamin-min-iron-FA-vit K (MULTI FOR HER) 18 mg iron-600 mcg-40 mcg capsule, Take by mouth., Disp: , Rfl:     Past Medical History:  has a past medical history of Breast cancer (CMS/HCC) and Estrogen receptor positive status (ER+).  Past Surgical History:  has a past surgical history that includes Breast biopsy; Breast lumpectomy (Left); [place] lymph node biopsy; Ankle ligament reconstruction (Right); and Tonsillectomy.  Social History:  reports that she has never smoked. She has never used smokeless tobacco. She reports that she drinks alcohol. She reports that she does not use drugs.  Family History: family history includes Hypertension in her biological father.  Allergies: is allergic to no known drug allergies.     Review of Systems   Constitutional: Negative.    HENT: Negative.    Respiratory: Negative.    Cardiovascular: Negative.    Gastrointestinal: Negative.    Endocrine: Negative.    Genitourinary: Negative.  "   Musculoskeletal: Negative.    Skin: Negative.    Allergic/Immunologic: Negative.    Neurological: Negative.    Hematological: Negative.    Psychiatric/Behavioral: Negative.      The following have been reviewed and updated as appropriate in this visit:  Allergies  Meds  Problems         Objective   Vitals:   Visit Vitals  BP (!) 176/93   Pulse 75   Ht 1.575 m (5' 2\")   Wt 93 kg (205 lb)   BMI 37.49 kg/m²     Physical Exam      Physical Exam   Constitutional: She is oriented to person, place, and time. She appears well-developed and well-nourished.   Eyes: Pupils are equal, round, and reactive to light.   Neck: Normal range of motion. Neck supple.   Cardiovascular: Normal rate, regular rhythm, normal heart sounds and intact distal pulses.    Pulmonary/Chest: Effort normal and breath sounds normal.   Abdominal: Soft. Bowel sounds are normal.   Neurological: She is alert and oriented to person, place, and time.   Skin: Skin is warm and dry.   Psychiatric: She has a normal mood and affect. Her behavior is normal. Judgment and thought content normal.   Breasts- there are no masses in either breast, no retractions skin or nipple, no nipple discharge could be demonstrated, there is no evidence of axillary supraclavicular adenopathy.  Assessment/Plan   Problem List Items Addressed This Visit     None      Visit Diagnoses     History of breast cancer    -  Primary        She had a normal mammogram and normal physical exam.  Clinically ANNA at 25 months.  As far as follow-up months of breast exam exam by physician twice year and a repeat mammogram one years time.  Return in about 6 months (around 6/6/2020).       Anna Burgess MD  "

## 2020-01-23 ENCOUNTER — LAB REQUISITION (OUTPATIENT)
Dept: LAB | Facility: HOSPITAL | Age: 75
End: 2020-01-23
Attending: INTERNAL MEDICINE
Payer: COMMERCIAL

## 2020-01-23 DIAGNOSIS — C50.912 MALIGNANT NEOPLASM OF UNSPECIFIED SITE OF LEFT FEMALE BREAST (CMS/HCC): ICD-10-CM

## 2020-01-23 LAB
ALBUMIN SERPL-MCNC: 3.5 G/DL (ref 3.4–5)
ALP SERPL-CCNC: 55 IU/L (ref 35–126)
ALT SERPL-CCNC: 18 IU/L (ref 11–54)
ANION GAP SERPL CALC-SCNC: 8 MEQ/L (ref 3–15)
AST SERPL-CCNC: 16 IU/L (ref 15–41)
BASOPHILS # BLD: 0.08 K/UL (ref 0.01–0.1)
BASOPHILS NFR BLD: 0.9 %
BILIRUB SERPL-MCNC: 0.5 MG/DL (ref 0.3–1.2)
BUN SERPL-MCNC: 14 MG/DL (ref 8–20)
CALCIUM SERPL-MCNC: 9.3 MG/DL (ref 8.9–10.3)
CHLORIDE SERPL-SCNC: 105 MEQ/L (ref 98–109)
CO2 SERPL-SCNC: 25 MEQ/L (ref 22–32)
CREAT SERPL-MCNC: 0.7 MG/DL
DIFFERENTIAL METHOD BLD: ABNORMAL
EOSINOPHIL # BLD: 0.09 K/UL (ref 0.04–0.36)
EOSINOPHIL NFR BLD: 1 %
ERYTHROCYTE [DISTWIDTH] IN BLOOD BY AUTOMATED COUNT: 12.7 % (ref 11.7–14.4)
GFR SERPL CREATININE-BSD FRML MDRD: >60 ML/MIN/1.73M*2
GLUCOSE SERPL-MCNC: 93 MG/DL (ref 70–99)
HCT VFR BLDCO AUTO: 37.5 %
HGB BLD-MCNC: 12.3 G/DL (ref 11.8–15.7)
IMM GRANULOCYTES # BLD AUTO: 0.04 K/UL (ref 0–0.08)
IMM GRANULOCYTES NFR BLD AUTO: 0.4 %
LYMPHOCYTES # BLD: 2.09 K/UL (ref 1.2–3.5)
LYMPHOCYTES NFR BLD: 22.6 %
MCH RBC QN AUTO: 30.5 PG (ref 28–33.2)
MCHC RBC AUTO-ENTMCNC: 32.8 G/DL (ref 32.2–35.5)
MCV RBC AUTO: 93.1 FL (ref 83–98)
MONOCYTES # BLD: 0.48 K/UL (ref 0.28–0.8)
MONOCYTES NFR BLD: 5.2 %
NEUTROPHILS # BLD: 6.46 K/UL (ref 1.7–7)
NEUTS SEG NFR BLD: 69.9 %
NRBC BLD-RTO: 0 %
PDW BLD AUTO: 10.1 FL (ref 9.4–12.3)
PLATELET # BLD AUTO: 441 K/UL
POTASSIUM SERPL-SCNC: 4.1 MEQ/L (ref 3.6–5.1)
PROT SERPL-MCNC: 6.4 G/DL (ref 6–8.2)
RBC # BLD AUTO: 4.03 M/UL (ref 3.93–5.22)
SODIUM SERPL-SCNC: 138 MEQ/L (ref 136–144)
WBC # BLD AUTO: 9.24 K/UL

## 2020-01-23 PROCEDURE — 85025 COMPLETE CBC W/AUTO DIFF WBC: CPT | Performed by: INTERNAL MEDICINE

## 2020-01-23 PROCEDURE — 36415 COLL VENOUS BLD VENIPUNCTURE: CPT | Performed by: INTERNAL MEDICINE

## 2020-01-23 PROCEDURE — 80053 COMPREHEN METABOLIC PANEL: CPT | Performed by: INTERNAL MEDICINE

## 2020-01-24 LAB
CHOLEST SERPL-MCNC: 180 MG/DL (ref 100–199)
HDLC SERPL-MCNC: 48 MG/DL
LDLC SERPL CALC-MCNC: 95 MG/DL (ref 0–99)
TRIGL SERPL-MCNC: 186 MG/DL (ref 0–149)
VLDLC SERPL CALC-MCNC: 37 MG/DL (ref 5–40)

## 2020-06-16 ENCOUNTER — OFFICE VISIT (OUTPATIENT)
Dept: SURGERY | Facility: CLINIC | Age: 75
End: 2020-06-16
Payer: COMMERCIAL

## 2020-06-16 VITALS
DIASTOLIC BLOOD PRESSURE: 80 MMHG | SYSTOLIC BLOOD PRESSURE: 132 MMHG | HEART RATE: 73 BPM | RESPIRATION RATE: 16 BRPM | OXYGEN SATURATION: 98 %

## 2020-06-16 DIAGNOSIS — Z17.0 MALIGNANT NEOPLASM OF CENTRAL PORTION OF LEFT BREAST IN FEMALE, ESTROGEN RECEPTOR POSITIVE (CMS/HCC): Primary | ICD-10-CM

## 2020-06-16 DIAGNOSIS — C50.112 MALIGNANT NEOPLASM OF CENTRAL PORTION OF LEFT BREAST IN FEMALE, ESTROGEN RECEPTOR POSITIVE (CMS/HCC): Primary | ICD-10-CM

## 2020-06-16 DIAGNOSIS — Z85.3 HISTORY OF BREAST CANCER: Primary | ICD-10-CM

## 2020-06-16 PROCEDURE — 99213 OFFICE O/P EST LOW 20 MIN: CPT | Performed by: SURGERY

## 2020-06-16 ASSESSMENT — ENCOUNTER SYMPTOMS
MUSCULOSKELETAL NEGATIVE: 1
NEUROLOGICAL NEGATIVE: 1
CONSTITUTIONAL NEGATIVE: 1
PSYCHIATRIC NEGATIVE: 1
ALLERGIC/IMMUNOLOGIC NEGATIVE: 1
GASTROINTESTINAL NEGATIVE: 1
RESPIRATORY NEGATIVE: 1
CARDIOVASCULAR NEGATIVE: 1
ENDOCRINE NEGATIVE: 1
HEMATOLOGIC/LYMPHATIC NEGATIVE: 1

## 2020-06-16 NOTE — PROGRESS NOTES
Patient ID: Maria Elena Lowry                              : 1945  MRN: [id number]                                            Visit Date: 2020  Encounter Provider: Jose Luis Burgess  Referring Provider: No ref. provider found    Subjective   Chief Complaint: Follow-up (history of breast cancer)    Maria Elena Lowry is a 74 y.o. old female presenting today for evaluation of her previously treated left breast cancer.  She is status post left lumpectomy and sentinel node biopsy for T1 N0 M0 significant carcinoma breast.  She received whole breast radiation.  Currently on aromatase inhibitor.  She had a bilateral mammogram in December which showed nothing high risk and no major complaints at this time..      Medications:   Current Outpatient Medications:   •  acetaminophen-codeine (TYLENOL #3) 300-30 mg per tablet, as needed., Disp: , Rfl:   •  anastrozole (ARIMIDEX) 1 mg tablet, , Disp: , Rfl: 0  •  cholecalciferol, vitamin D3, (cholecalciferol) 400 unit tablet tablet, Take by mouth., Disp: , Rfl:   •  multivitamin-min-iron-FA-vit K (MULTI FOR HER) 18 mg iron-600 mcg-40 mcg capsule, Take by mouth., Disp: , Rfl:     Past Medical History:  has a past medical history of Breast cancer (CMS/HCC) and Estrogen receptor positive status (ER+).  Past Surgical History:  has a past surgical history that includes Breast biopsy; Breast lumpectomy (Left); [place] lymph node biopsy; Ankle ligament reconstruction (Right); and Tonsillectomy.  Social History:  reports that she has never smoked. She has never used smokeless tobacco. She reports that she drinks alcohol. She reports that she does not use drugs.  Family History: family history includes Hypertension in her biological father.  Allergies: is allergic to no known drug allergies.     Review of Systems   Constitutional: Negative.    HENT: Negative.    Respiratory: Negative.    Cardiovascular: Negative.    Gastrointestinal: Negative.    Endocrine: Negative.     Genitourinary: Negative.    Musculoskeletal: Negative.    Skin: Negative.    Allergic/Immunologic: Negative.    Neurological: Negative.    Hematological: Negative.    Psychiatric/Behavioral: Negative.      The following have been reviewed and updated as appropriate in this visit:  Allergies  Meds  Problems         Objective   Vitals:   Visit Vitals  /80   Pulse 73   Resp 16   SpO2 98%     Physical Exam      Physical Exam   Constitutional: She is oriented to person, place, and time. She appears well-developed and well-nourished.   Eyes: Pupils are equal, round, and reactive to light.   Neck: Normal range of motion. Neck supple.   Cardiovascular: Normal rate, regular rhythm, normal heart sounds and intact distal pulses.    Pulmonary/Chest: Effort normal and breath sounds normal.   Abdominal: Soft. Bowel sounds are normal.   Neurological: She is alert and oriented to person, place, and time.   Skin: Skin is warm and dry.   Psychiatric: She has a normal mood and affect. Her behavior is normal. Judgment and thought content normal.   Breasts- there are no masses in either breast, no retractions skin or nipple, no nipple discharge could be demonstrated, there is no evidence of axillary supraclavicular adenopathy.    Assessment/Plan   Problem List Items Addressed This Visit        Other    Malignant neoplasm of left breast in female, estrogen receptor positive (CMS/HCC) - Primary        She had a normal mammogram in December normal physical exam.  Clinically ANNA at 31 months.  Sparse follow-up monthly self breast exam exam by physician twice year I will see her back in 6 months time with a bilateral mammogram.  Return in about 6 months (around 12/16/2020).       Anna Burgess MD

## 2020-07-21 ENCOUNTER — LAB REQUISITION (OUTPATIENT)
Dept: LAB | Facility: HOSPITAL | Age: 75
End: 2020-07-21
Attending: INTERNAL MEDICINE
Payer: COMMERCIAL

## 2020-07-21 DIAGNOSIS — C50.912 MALIGNANT NEOPLASM OF UNSPECIFIED SITE OF LEFT FEMALE BREAST (CMS/HCC): ICD-10-CM

## 2020-07-21 LAB
ALBUMIN SERPL-MCNC: 3.5 G/DL (ref 3.4–5)
ALP SERPL-CCNC: 58 IU/L (ref 35–126)
ALT SERPL-CCNC: 20 IU/L (ref 11–54)
ANION GAP SERPL CALC-SCNC: 7 MEQ/L (ref 3–15)
AST SERPL-CCNC: 18 IU/L (ref 15–41)
BASOPHILS # BLD: 0.07 K/UL (ref 0.01–0.1)
BASOPHILS NFR BLD: 0.8 %
BILIRUB SERPL-MCNC: 0.8 MG/DL (ref 0.3–1.2)
BUN SERPL-MCNC: 18 MG/DL (ref 8–20)
CALCIUM SERPL-MCNC: 9.3 MG/DL (ref 8.9–10.3)
CHLORIDE SERPL-SCNC: 107 MEQ/L (ref 98–109)
CO2 SERPL-SCNC: 24 MEQ/L (ref 22–32)
CREAT SERPL-MCNC: 0.7 MG/DL (ref 0.6–1.1)
DIFFERENTIAL METHOD BLD: NORMAL
EOSINOPHIL # BLD: 0.14 K/UL (ref 0.04–0.36)
EOSINOPHIL NFR BLD: 1.7 %
ERYTHROCYTE [DISTWIDTH] IN BLOOD BY AUTOMATED COUNT: 12.9 % (ref 11.7–14.4)
GFR SERPL CREATININE-BSD FRML MDRD: >60 ML/MIN/1.73M*2
GLUCOSE SERPL-MCNC: 100 MG/DL (ref 70–99)
HCT VFR BLDCO AUTO: 38 % (ref 35–45)
HGB BLD-MCNC: 12.5 G/DL (ref 11.8–15.7)
IMM GRANULOCYTES # BLD AUTO: 0.02 K/UL (ref 0–0.08)
IMM GRANULOCYTES NFR BLD AUTO: 0.2 %
LYMPHOCYTES # BLD: 1.86 K/UL (ref 1.2–3.5)
LYMPHOCYTES NFR BLD: 22 %
MCH RBC QN AUTO: 31.1 PG (ref 28–33.2)
MCHC RBC AUTO-ENTMCNC: 32.9 G/DL (ref 32.2–35.5)
MCV RBC AUTO: 94.5 FL (ref 83–98)
MONOCYTES # BLD: 0.57 K/UL (ref 0.28–0.8)
MONOCYTES NFR BLD: 6.7 %
NEUTROPHILS # BLD: 5.79 K/UL (ref 1.7–7)
NEUTROPHILS # BLD: 5.79 K/UL (ref 1.7–7)
NEUTS SEG NFR BLD: 68.6 %
NRBC BLD-RTO: 0 %
PDW BLD AUTO: 9.8 FL (ref 9.4–12.3)
PLATELET # BLD AUTO: 367 K/UL (ref 150–369)
POTASSIUM SERPL-SCNC: 4.5 MEQ/L (ref 3.6–5.1)
PROT SERPL-MCNC: 6.1 G/DL (ref 6–8.2)
RBC # BLD AUTO: 4.02 M/UL (ref 3.93–5.22)
SODIUM SERPL-SCNC: 138 MEQ/L (ref 136–144)
WBC # BLD AUTO: 8.45 K/UL (ref 3.8–10.5)

## 2020-07-21 PROCEDURE — 85025 COMPLETE CBC W/AUTO DIFF WBC: CPT | Performed by: INTERNAL MEDICINE

## 2020-07-21 PROCEDURE — 36415 COLL VENOUS BLD VENIPUNCTURE: CPT | Performed by: INTERNAL MEDICINE

## 2020-07-21 PROCEDURE — 80053 COMPREHEN METABOLIC PANEL: CPT | Performed by: INTERNAL MEDICINE

## 2020-11-25 ENCOUNTER — TRANSCRIBE ORDERS (OUTPATIENT)
Dept: LAB | Facility: HOSPITAL | Age: 75
End: 2020-11-25

## 2020-11-25 ENCOUNTER — OFFICE VISIT (OUTPATIENT)
Dept: PREADMISSION TESTING | Facility: HOSPITAL | Age: 75
End: 2020-11-25
Attending: ORTHOPAEDIC SURGERY
Payer: COMMERCIAL

## 2020-11-25 ENCOUNTER — APPOINTMENT (OUTPATIENT)
Dept: LAB | Facility: HOSPITAL | Age: 75
End: 2020-11-25
Attending: ORTHOPAEDIC SURGERY
Payer: COMMERCIAL

## 2020-11-25 VITALS
DIASTOLIC BLOOD PRESSURE: 76 MMHG | OXYGEN SATURATION: 99 % | HEIGHT: 62 IN | BODY MASS INDEX: 40.3 KG/M2 | TEMPERATURE: 98.4 F | WEIGHT: 219 LBS | HEART RATE: 67 BPM | RESPIRATION RATE: 16 BRPM | SYSTOLIC BLOOD PRESSURE: 146 MMHG

## 2020-11-25 DIAGNOSIS — M17.12 UNILATERAL PRIMARY OSTEOARTHRITIS, LEFT KNEE: ICD-10-CM

## 2020-11-25 DIAGNOSIS — I10 ESSENTIAL HYPERTENSION: ICD-10-CM

## 2020-11-25 DIAGNOSIS — Z85.3 HISTORY OF LEFT BREAST CANCER: ICD-10-CM

## 2020-11-25 DIAGNOSIS — Z91.89 AT RISK FOR OBSTRUCTIVE SLEEP APNEA: ICD-10-CM

## 2020-11-25 DIAGNOSIS — R73.03 PREDIABETES: ICD-10-CM

## 2020-11-25 DIAGNOSIS — Z01.818 PREOP EXAMINATION: Primary | ICD-10-CM

## 2020-11-25 DIAGNOSIS — M17.12 UNILATERAL PRIMARY OSTEOARTHRITIS, LEFT KNEE: Primary | ICD-10-CM

## 2020-11-25 LAB
ABO + RH BLD: NORMAL
ANION GAP SERPL CALC-SCNC: 11 MEQ/L (ref 3–15)
ATRIAL RATE: 63
BLD GP AB SCN SERPL QL: NEGATIVE
BLOOD BANK CMNT PATIENT-IMP: NORMAL
BUN SERPL-MCNC: 18 MG/DL (ref 8–20)
CALCIUM SERPL-MCNC: 9.5 MG/DL (ref 8.9–10.3)
CHLORIDE SERPL-SCNC: 105 MEQ/L (ref 98–109)
CO2 SERPL-SCNC: 23 MEQ/L (ref 22–32)
CREAT SERPL-MCNC: 0.7 MG/DL (ref 0.6–1.1)
D AG BLD QL: POSITIVE
ERYTHROCYTE [DISTWIDTH] IN BLOOD BY AUTOMATED COUNT: 13 % (ref 11.7–14.4)
EST. AVERAGE GLUCOSE BLD GHB EST-MCNC: 120 MG/DL
GFR SERPL CREATININE-BSD FRML MDRD: >60 ML/MIN/1.73M*2
GLUCOSE SERPL-MCNC: 83 MG/DL (ref 70–99)
HBA1C MFR BLD HPLC: 5.8 %
HCT VFR BLDCO AUTO: 39 % (ref 35–45)
HGB BLD-MCNC: 12.5 G/DL (ref 11.8–15.7)
LABORATORY COMMENT REPORT: NORMAL
MCH RBC QN AUTO: 30.4 PG (ref 28–33.2)
MCHC RBC AUTO-ENTMCNC: 32.1 G/DL (ref 32.2–35.5)
MCV RBC AUTO: 94.9 FL (ref 83–98)
P AXIS: 38
PDW BLD AUTO: 10.9 FL (ref 9.4–12.3)
PLATELET # BLD AUTO: 464 K/UL (ref 150–369)
POTASSIUM SERPL-SCNC: 4.5 MEQ/L (ref 3.6–5.1)
PR INTERVAL: 160
QRS DURATION: 100
QT INTERVAL: 444
QTC CALCULATION(BAZETT): 454
R AXIS: -45
RBC # BLD AUTO: 4.11 M/UL (ref 3.93–5.22)
SODIUM SERPL-SCNC: 139 MEQ/L (ref 136–144)
T WAVE AXIS: 32
VENTRICULAR RATE: 63
WBC # BLD AUTO: 8.66 K/UL (ref 3.8–10.5)

## 2020-11-25 PROCEDURE — 83036 HEMOGLOBIN GLYCOSYLATED A1C: CPT

## 2020-11-25 PROCEDURE — 93005 ELECTROCARDIOGRAM TRACING: CPT

## 2020-11-25 PROCEDURE — 93010 ELECTROCARDIOGRAM REPORT: CPT | Performed by: INTERNAL MEDICINE

## 2020-11-25 PROCEDURE — 80048 BASIC METABOLIC PNL TOTAL CA: CPT

## 2020-11-25 PROCEDURE — 99203 OFFICE O/P NEW LOW 30 MIN: CPT | Performed by: HOSPITALIST

## 2020-11-25 PROCEDURE — U0003 INFECTIOUS AGENT DETECTION BY NUCLEIC ACID (DNA OR RNA); SEVERE ACUTE RESPIRATORY SYNDROME CORONAVIRUS 2 (SARS-COV-2) (CORONAVIRUS DISEASE [COVID-19]), AMPLIFIED PROBE TECHNIQUE, MAKING USE OF HIGH THROUGHPUT TECHNOLOGIES AS DESCRIBED BY CMS-2020-01-R: HCPCS

## 2020-11-25 PROCEDURE — 85027 COMPLETE CBC AUTOMATED: CPT

## 2020-11-25 PROCEDURE — 36415 COLL VENOUS BLD VENIPUNCTURE: CPT

## 2020-11-25 PROCEDURE — 86900 BLOOD TYPING SEROLOGIC ABO: CPT

## 2020-11-25 RX ORDER — HYDROCHLOROTHIAZIDE 12.5 MG/1
12.5 TABLET ORAL DAILY
COMMUNITY
Start: 2020-10-22

## 2020-11-25 RX ORDER — IBUPROFEN 200 MG
200 TABLET ORAL AS NEEDED
COMMUNITY
End: 2021-03-16 | Stop reason: HOSPADM

## 2020-11-25 RX ORDER — TRIAMCINOLONE ACETONIDE 1 MG/G
1 OINTMENT TOPICAL AS NEEDED
Status: ON HOLD | COMMUNITY
Start: 2020-09-10 | End: 2021-03-16 | Stop reason: SDUPTHER

## 2020-11-25 SDOH — HEALTH STABILITY: MENTAL HEALTH: HOW OFTEN DO YOU HAVE SIX OR MORE DRINKS ON ONE OCCASION?: WEEKLY

## 2020-11-25 ASSESSMENT — PAIN SCALES - GENERAL: PAINLEVEL: 0-NO PAIN

## 2020-11-25 NOTE — ASSESSMENT & PLAN NOTE
The patient's STOB BANG score was elevated at 4. Please employ close monitoring including CO2 monitoring in attempt to prevent and/or manage hypercarbic respiratory failure.  I would recommend use of our JEREMY protocol as JEREMY places the patient at relatively increased risk compared to a population without this diagnosis of oxygen desaturation, cardiac events, acute respiratory failure, or an ICU transfer.

## 2020-11-25 NOTE — CONSULTS
[facility] Medicine Service -  Pre-Operative Consultation       Patient Name: Maria Elena Lowry  Referring Surgeon: Eric Burks DO   Reason for Referral: Pre-Operative Evaluation  Surgical Procedure: Left Total Knee Replacement  Operative Date: 11/30/20  Other Providers:      PCP: Steven Galdamez DO        HISTORY OF PRESENT ILLNESS      Maria Elena Lowry is a 75 y.o. female presenting today to the [facility] at [facility] for pre-operative evaluation prior to planned surgery.    Patient reports 1.5 years of progressive left knee pain. Pain is focal to the knee and noted to be severe. She has difficulty walking and utilizes a cane. She tells me she will fall without it. She reports pain that wakes her from sleep throughout the night and she has difficulty finding a comfortable position. Her pain is aggravated by activity and improves with rest. She has been participating in PT to prepare for surgery. She would like to proceed with joint replacement.     In regards to medical history:  - Essential Hypertension, for which she saw started on hctz about 3 months ago. Regular BP checks at home, 140s/70-80s typical for her.   - Left Breast Cancer, Stage I, diagnosed 10/17 s/p lumpectomy, radiation, and was started on anastrozole 3/29/18. Follows with Dr. Bain    The patient denies any current or recent chest pain or pressure, dyspnea, cough, sputum, fevers, chills, abdominal pain, nausea, vomiting, diarrhea or other symptoms.   Denies a personal or family history of easy/excessive bleeding or thrombosis.  Functionally, the patient is able to ascend a flight or so of stairs with no dyspnea or chest pain. She participates in PT and does the PT exercises at home as well. She denies angina.     The patient denies, on specific questioning, the following:  No history of MI, arrhythmia,or CHF.  No history of JEREMY.  No history of DVT/PE.  No history of COPD.  No  history of CVA.  No history of DM.   No history of CKD.     PAST MEDICAL AND SURGICAL HISTORY      Past Medical History:   Diagnosis Date   • Arthritis    • Breast cancer (CMS/HCC) 2017    Left ( Lumpectomy and radiationn therapy)   • Estrogen receptor positive status (ER+)    • Hypertension    • Obesity        Past Surgical History:   Procedure Laterality Date   • ANKLE LIGAMENT RECONSTRUCTION Right    • BREAST BIOPSY     • BREAST LUMPECTOMY Left    • COLONOSCOPY     • SENTINEL LYMPH NODE BIOPSY     • TONSILLECTOMY         MEDICATIONS        Current Outpatient Medications:   •  ibuprofen (AdviL) 200 mg tablet, Take 200 mg by mouth as needed for mild pain., Disp: , Rfl:   •  anastrozole (ARIMIDEX) 1 mg tablet, Take  1 mg daily   , Disp: , Rfl: 0  •  hydrochlorothiazide (HYDRODIURIL) 12.5 mg tablet, Take 12.5 mg by mouth daily., Disp: , Rfl:   •  multivitamin-min-iron-FA-vit K (MULTI FOR HER) 18 mg iron-600 mcg-40 mcg capsule, Take 1 tablet by mouth daily.  , Disp: , Rfl:   •  triamcinolone (KENALOG) 0.1 % ointment, Apply 1 application topically as needed., Disp: , Rfl:     ALLERGIES      No known drug allergies    FAMILY HISTORY      family history includes Brain cancer in her biological sister; Breast cancer in her biological mother; Colon cancer in her biological mother; Dementia in her biological father; Diabetes in her biological brother; Heart disease in her biological brother; Hypertension in her biological brother, biological father, and biological sister.    Denies any prior known family history of DVTs/PEs/clotting disorder    SOCIAL HISTORY      Social History     Tobacco Use   • Smoking status: Never Smoker   • Smokeless tobacco: Never Used   Substance Use Topics   • Alcohol use: Yes     Binge frequency: Weekly     Comment: wine   • Drug use: No       REVIEW OF SYSTEMS      Constitutional: Denies Fever, Chills, Fatigue, Malaise, Unintentional Weight loss  HEENT: Denies Vision changes, Epistaxis, Trouble  "Swallowing, Sore Throat  Respiratory: Denies Cough, Shortness of Breath, Wheezing  Cardiovascular: Denies Chest Pain, Exertional Dyspnea, Palpitations, Edema  GI: Denies Abdominal pain, Nausea, Vomiting, Changes in bowel movements, Blood in stool   : Denies Dysuria, Hematuria  Musculoskeletal: Denies Back pain, Neck pain, + left knee pain  Skin: Denies rash  Neuro: Denies Headache, Syncope, Weakness, Numbness  Heme: Denies Bleeding      PHYSICAL EXAMINATION      Visit Vitals  BP (!) 146/76 (BP Location: Left upper arm, Patient Position: Sitting)   Pulse 67   Temp 36.9 °C (98.4 °F) (Oral)   Resp 16   Ht 1.575 m (5' 2\")   Wt 99.3 kg (219 lb)   SpO2 99% Comment: Rm Air   BMI 40.06 kg/m²     Body mass index is 40.06 kg/m².    Physical Exam   Constitutional: Well developed, Obese, No apparent distress, Appears Comfortable  Neck: supple, no LAD  Cardiovascular: Normal rate, Regular Rhythm, Normal heart sounds, No murmur noted, No carotid bruits  Pulmonary: Normal effort without distress, Normal breath sounds without wheezing, rhonchi, or rales  Abdomen: Soft, no distension, nontender, normal bowel sounds  Extremities: No edema  Neurologic: No gross abnormalities, patient ambulates with cane.  Skin: Warm, No rash  Psychiatric: Normal mood and affect      LABS / EKG        Labs  Lab Results   Component Value Date     11/25/2020    K 4.5 11/25/2020     11/25/2020    BUN 18 11/25/2020    CREATININE 0.7 11/25/2020    WBC 8.66 11/25/2020    HGB 12.5 11/25/2020    HCT 39.0 11/25/2020     (H) 11/25/2020    ALT 20 07/21/2020    AST 18 07/21/2020    HGBA1C 5.8 (H) 11/25/2020         ECG/Telemetry  Normal sinus rhythm, left anterior fascicular block unchanged from prior EKG 9/27/17    ASSESSMENT AND PLAN         Preop examination  Medical management and enoc-operative risk commentary as noted below.      Primary localized osteoarthritis of left knee  Patient reports 1.5 years of progressive knee pain which " significantly limits her daily activities. She would like to proceed with joint replacement.     Hypertension  Patient started hctz a few months ago for essential hypertension, and follows with Dr. Galdamez for this. She checks her BP at home, and 140s/80s typical for her. Her blood pressure is acceptable for upcoming surgery. She will hold her hctz on the morning of surgery. This may be resumed post-op assuming no renal dysfunction, hypotension, nor volume depletion.       Malignant neoplasm of left breast in female, estrogen receptor positive (CMS/HCC)  Diagnosed 2017, s/p lumpectomy, radiation, and is currently on anastrozole, which she will continue. She follows regularly with Dr. Bain and Dr. Burgess.     At risk for obstructive sleep apnea  The patient's STOB BANG score was elevated at 4. Please employ close monitoring including CO2 monitoring in attempt to prevent and/or manage hypercarbic respiratory failure.  I would recommend use of our JEREMY protocol as JEREMY places the patient at relatively increased risk compared to a population without this diagnosis of oxygen desaturation, cardiac events, acute respiratory failure, or an ICU transfer.       Prediabetes  The patient may have a brisk physiologic hyperglycemia response to surgery. If glucose values are persistently > 180, monitor glucose as the patient may transiently require insulin to assist with optimizing glycemic control.          In regards to perioperative cardiac risk:  The patient denies any history of ischemic heart disease, denies any history of CHF, denies any history of CVA, is not on pre-operative treatment with insulin, and does not have a pre-operative creatinine > 2 mg/dL.   The Revised Cardiac Risk Index (RCRI) for this patient indicates 0.4% risk of cardiac death, nonfatal MI, and nonfatal cardiac arrest.    Further comments:  Resume supplements when OK with surgical team.  I would encourage incentive spirometry to assist with minimizing  enoc-operative pulmonary risk.  DVT prophylaxis choice and timing of such per the discretion of Dr. Burks.     Please do not hesitate to contact [facility] during the upcoming hospitalization with any questions or concerns.     Lacey Norris MD  11/25/2020

## 2020-11-25 NOTE — PRE-PROCEDURE INSTRUCTIONS
1. We will call you between 3 pm and 7 pm on November 27, 2020 to determine that arrival time for your procedure. If you do not hear by 6PM. Please call [phone] for arrival time.    2. Please report to Main Entrance near Parking lot A, walk into main lobby and report to the admission desk on the first floor on the day of your procedure.       3. Please follow the following fasting guidelines:     No solids for EIGHT HOURS prior to surgery.  A light meal, meaning plain toast ONLY, is permitted for up to SIX HOURS prior to surgery.  Unlimited clear liquids, meaning water or PLAIN black coffee WITHOUT any milk or cream, are permitted up to TWO HOURS prior to arrival at the hospital.     4. Early on the morning of the procedure please take your usual dose of the listed medications anastrozole (ARIMIDEX) with a sip of water:     Per anesthesiologist, do not take hydrochlorothiazide (HYDRODIURIL) on the morning of surgery    PLEASE FOLLOW DR NAVARRO INSTRUCTION FOR THE BODY WASH, NBSAIDs AND SUPPLEMENTS      5. Other Instructions: You may brush your teeth the morning of the procedure. Rinse and spit, do not swallow.  Bring a list of your medications with dosages with you.  Use surgical wash as directed.    6. If you develop a cold, cough, fever, rash, or other symptom prior to the data of the procedure, please report it to your physician immediately.   7. If you need to cancel the procedure for any reason, please contact your physician or call the unit listed above.   8. Make arrangements to have someone drive you home from the procedure. If you have not arranged for transportation home, your surgery may be cancelled.    9. You may not take public transportation unless accompanied by a responsible person.   10. You may not drive a car or operate complex or potentially dangerous machinery for 24 hours following anesthesia and/or sedation.   11. If it is medically necessary for you to have a longer stay, you will be  informed as soon as the decision is made.   12. Do not wear or bring anything of value to the hospital including jewelry of any kind, money, or wallet. Do not wear make-up or contact lenses. DO bring your glasses and hearing aid. DO NOT BRING MEDICATIONS FROM HOME.   13. No lotion, creams, powders, or oils on skin the morning of procedure    14. Dress in comfortable clothes.   15.  If instructed, please bring a copy of your Advanced Directive (Living Will/Durable Power of ) on the day of your procedure.      Pre operative instructions given as per protocol.  Form explained by: ELLIE Swenson     I have read and understand the above information. I have had sufficient opportunity to ask questions I might have and they have been answered to my satisfaction. I agree to comply with the Patient Responsibilities listed above and have received a copy of this form.

## 2020-11-25 NOTE — ASSESSMENT & PLAN NOTE
Patient reports 1.5 years of progressive knee pain which significantly limits her daily activities. She would like to proceed with joint replacement.

## 2020-11-25 NOTE — ASSESSMENT & PLAN NOTE
Patient started hctz a few months ago for essential hypertension, and follows with Dr. Galdamez for this. She checks her BP at home, and 140s/80s typical for her. Her blood pressure is acceptable for upcoming surgery. She will hold her hctz on the morning of surgery. This may be resumed post-op assuming no renal dysfunction, hypotension, nor volume depletion.

## 2020-11-25 NOTE — ASSESSMENT & PLAN NOTE
Diagnosed 2017, s/p lumpectomy, radiation, and is currently on anastrozole, which she will continue. She follows regularly with Dr. Bain and Dr. Burgess.

## 2020-11-26 NOTE — ASSESSMENT & PLAN NOTE
The patient may have a brisk physiologic hyperglycemia response to surgery. If glucose values are persistently > 180, monitor glucose as the patient may transiently require insulin to assist with optimizing glycemic control.

## 2020-11-27 LAB — SARS-COV-2 RNA RESP QL NAA+PROBE: NOT DETECTED

## 2020-12-15 ENCOUNTER — HOSPITAL ENCOUNTER (OUTPATIENT)
Dept: RADIOLOGY | Facility: HOSPITAL | Age: 75
Discharge: HOME | End: 2020-12-15
Attending: SURGERY
Payer: COMMERCIAL

## 2020-12-15 ENCOUNTER — OFFICE VISIT (OUTPATIENT)
Dept: SURGERY | Facility: CLINIC | Age: 75
End: 2020-12-15
Payer: COMMERCIAL

## 2020-12-15 VITALS
BODY MASS INDEX: 40.3 KG/M2 | DIASTOLIC BLOOD PRESSURE: 70 MMHG | SYSTOLIC BLOOD PRESSURE: 148 MMHG | WEIGHT: 219 LBS | HEART RATE: 68 BPM | HEIGHT: 62 IN

## 2020-12-15 DIAGNOSIS — Z17.0 MALIGNANT NEOPLASM OF CENTRAL PORTION OF LEFT BREAST IN FEMALE, ESTROGEN RECEPTOR POSITIVE (CMS/HCC): Primary | ICD-10-CM

## 2020-12-15 DIAGNOSIS — C50.112 MALIGNANT NEOPLASM OF CENTRAL PORTION OF LEFT BREAST IN FEMALE, ESTROGEN RECEPTOR POSITIVE (CMS/HCC): Primary | ICD-10-CM

## 2020-12-15 DIAGNOSIS — Z91.89 AT RISK FOR OBSTRUCTIVE SLEEP APNEA: ICD-10-CM

## 2020-12-15 DIAGNOSIS — Z85.3 HISTORY OF BREAST CANCER: ICD-10-CM

## 2020-12-15 PROCEDURE — 99213 OFFICE O/P EST LOW 20 MIN: CPT | Performed by: SURGERY

## 2020-12-15 PROCEDURE — 77066 DX MAMMO INCL CAD BI: CPT

## 2020-12-15 ASSESSMENT — ENCOUNTER SYMPTOMS
CARDIOVASCULAR NEGATIVE: 1
GASTROINTESTINAL NEGATIVE: 1
ALLERGIC/IMMUNOLOGIC NEGATIVE: 1
ENDOCRINE NEGATIVE: 1
CONSTITUTIONAL NEGATIVE: 1
RESPIRATORY NEGATIVE: 1
MUSCULOSKELETAL NEGATIVE: 1
NEUROLOGICAL NEGATIVE: 1
HEMATOLOGIC/LYMPHATIC NEGATIVE: 1
PSYCHIATRIC NEGATIVE: 1

## 2020-12-15 NOTE — PROGRESS NOTES
Patient ID: Maria Elena Lowry                              : 1945  MRN: [id number]                                            Visit Date: 12/15/2020  Encounter Provider: Jose Luis Burgess  Referring Provider: No ref. provider found    Subjective   Chief Complaint: Breast Check (follow up/[facility] )    Maria Elena Lowry is a 75 y.o. old female presenting today for evaluation of previously treated left breast cancer.  She is status post left lumpectomy and sentinel node biopsy for T1 N0 M0 stage I will trend up for breast carcinoma.  She received whole breast radiation.  She is currently on aromatase inhibitor.  She had bilateral mammogram today showed nothing high risk and no major complaints at this time..      Medications:   Current Outpatient Medications:   •  anastrozole (ARIMIDEX) 1 mg tablet, Take  1 mg daily   , Disp: , Rfl: 0  •  hydrochlorothiazide (HYDRODIURIL) 12.5 mg tablet, Take 12.5 mg by mouth daily., Disp: , Rfl:   •  ibuprofen (AdviL) 200 mg tablet, Take 200 mg by mouth as needed for mild pain., Disp: , Rfl:   •  multivitamin-min-iron-FA-vit K (MULTI FOR HER) 18 mg iron-600 mcg-40 mcg capsule, Take 1 tablet by mouth daily.  , Disp: , Rfl:   •  triamcinolone (KENALOG) 0.1 % ointment, Apply 1 application topically as needed., Disp: , Rfl:     Past Medical History:  has a past medical history of Arthritis, Breast cancer (CMS/HCC) (2017), Estrogen receptor positive status (ER+), Hypertension, and Obesity.  Past Surgical History:  has a past surgical history that includes Breast biopsy; Breast lumpectomy (Left); [place] lymph node biopsy; Ankle ligament reconstruction (Right); Tonsillectomy; and Colonoscopy.  Social History:  reports that she has never smoked. She has never used smokeless tobacco. She reports current alcohol use. She reports that she does not use drugs.  Family History: family history includes Brain cancer in her biological sister; Breast cancer in her biological mother; Colon cancer in  "her biological mother; Dementia in her biological father; Diabetes in her biological brother; Heart disease in her biological brother; Hypertension in her biological brother, biological father, and biological sister.  Allergies: is allergic to no known drug allergies.     Review of Systems   Constitutional: Negative.    HENT: Negative.    Respiratory: Negative.    Cardiovascular: Negative.    Gastrointestinal: Negative.    Endocrine: Negative.    Genitourinary: Negative.    Musculoskeletal: Negative.    Skin: Negative.    Allergic/Immunologic: Negative.    Neurological: Negative.    Hematological: Negative.    Psychiatric/Behavioral: Negative.      The following have been reviewed and updated as appropriate in this visit:         Objective   Vitals:   Visit Vitals  BP (!) 148/70   Pulse 68   Ht 1.575 m (5' 2\")   Wt 99.3 kg (219 lb)   BMI 40.06 kg/m²     Physical Exam  Physical Exam   Constitutional: She is oriented to person, place, and time. She appears well-developed and well-nourished.   Eyes: Pupils are equal, round, and reactive to light.   Neck: Normal range of motion. Neck supple.   Cardiovascular: Normal rate, regular rhythm, normal heart sounds and intact distal pulses.    Pulmonary/Chest: Effort normal and breath sounds normal.   Abdominal: Soft. Bowel sounds are normal.   Neurological: She is alert and oriented to person, place, and time.   Skin: Skin is warm and dry.   Psychiatric: She has a normal mood and affect. Her behavior is normal. Judgment and thought content normal.   Breasts- there are no masses in either breast, no retractions skin or nipple, no nipple discharge could be demonstrated, there is no evidence of axillary supraclavicular adenopathy.       Assessment/Plan   Problem List Items Addressed This Visit        Other    Malignant neoplasm of left breast in female, estrogen receptor positive (CMS/HCC) - Primary        Had normal mammogram normal physical exam peers for his follow-up myself " breast exam, exam by physician twice yearly repeat mammogram 1 years time.  No follow-ups on file.       Jose Luis Burgess MD

## 2021-03-10 ENCOUNTER — TRANSCRIBE ORDERS (OUTPATIENT)
Dept: LAB | Facility: HOSPITAL | Age: 76
End: 2021-03-10

## 2021-03-10 ENCOUNTER — OFFICE VISIT (OUTPATIENT)
Dept: PREADMISSION TESTING | Facility: HOSPITAL | Age: 76
End: 2021-03-10
Attending: ORTHOPAEDIC SURGERY
Payer: COMMERCIAL

## 2021-03-10 ENCOUNTER — APPOINTMENT (OUTPATIENT)
Dept: LAB | Facility: HOSPITAL | Age: 76
End: 2021-03-10
Attending: ORTHOPAEDIC SURGERY
Payer: COMMERCIAL

## 2021-03-10 VITALS
DIASTOLIC BLOOD PRESSURE: 80 MMHG | RESPIRATION RATE: 16 BRPM | OXYGEN SATURATION: 97 % | HEART RATE: 61 BPM | BODY MASS INDEX: 35.74 KG/M2 | WEIGHT: 194.2 LBS | TEMPERATURE: 98 F | SYSTOLIC BLOOD PRESSURE: 148 MMHG | HEIGHT: 62 IN

## 2021-03-10 DIAGNOSIS — I10 ESSENTIAL HYPERTENSION: ICD-10-CM

## 2021-03-10 DIAGNOSIS — M17.12 UNILATERAL PRIMARY OSTEOARTHRITIS, LEFT KNEE: Primary | ICD-10-CM

## 2021-03-10 DIAGNOSIS — M17.12 UNILATERAL PRIMARY OSTEOARTHRITIS, LEFT KNEE: ICD-10-CM

## 2021-03-10 DIAGNOSIS — Z85.3 HISTORY OF LEFT BREAST CANCER: ICD-10-CM

## 2021-03-10 DIAGNOSIS — Z91.89 AT RISK FOR OBSTRUCTIVE SLEEP APNEA: ICD-10-CM

## 2021-03-10 DIAGNOSIS — Z01.818 PREOP EXAMINATION: Primary | ICD-10-CM

## 2021-03-10 DIAGNOSIS — R73.03 PREDIABETES: ICD-10-CM

## 2021-03-10 LAB
ABO + RH BLD: NORMAL
ANION GAP SERPL CALC-SCNC: 10 MEQ/L (ref 3–15)
BLD GP AB SCN SERPL QL: NEGATIVE
BLOOD BANK CMNT PATIENT-IMP: NORMAL
BUN SERPL-MCNC: 26 MG/DL (ref 8–20)
CALCIUM SERPL-MCNC: 9.4 MG/DL (ref 8.9–10.3)
CHLORIDE SERPL-SCNC: 105 MEQ/L (ref 98–109)
CO2 SERPL-SCNC: 27 MEQ/L (ref 22–32)
CREAT SERPL-MCNC: 0.7 MG/DL (ref 0.6–1.1)
D AG BLD QL: POSITIVE
ERYTHROCYTE [DISTWIDTH] IN BLOOD BY AUTOMATED COUNT: 12.3 % (ref 11.7–14.4)
EST. AVERAGE GLUCOSE BLD GHB EST-MCNC: 126 MG/DL
GFR SERPL CREATININE-BSD FRML MDRD: >60 ML/MIN/1.73M*2
GLUCOSE SERPL-MCNC: 93 MG/DL (ref 70–99)
HBA1C MFR BLD HPLC: 6 %
HCT VFR BLDCO AUTO: 38 % (ref 35–45)
HGB BLD-MCNC: 12.3 G/DL (ref 11.8–15.7)
LABORATORY COMMENT REPORT: NORMAL
MCH RBC QN AUTO: 30.2 PG (ref 28–33.2)
MCHC RBC AUTO-ENTMCNC: 32.4 G/DL (ref 32.2–35.5)
MCV RBC AUTO: 93.4 FL (ref 83–98)
PDW BLD AUTO: 11.2 FL (ref 9.4–12.3)
PLATELET # BLD AUTO: 394 K/UL (ref 150–369)
POTASSIUM SERPL-SCNC: 4.5 MEQ/L (ref 3.6–5.1)
RBC # BLD AUTO: 4.07 M/UL (ref 3.93–5.22)
SARS-COV-2 RNA RESP QL NAA+PROBE: NEGATIVE
SODIUM SERPL-SCNC: 142 MEQ/L (ref 136–144)
WBC # BLD AUTO: 8.09 K/UL (ref 3.8–10.5)

## 2021-03-10 PROCEDURE — 80048 BASIC METABOLIC PNL TOTAL CA: CPT

## 2021-03-10 PROCEDURE — 99214 OFFICE O/P EST MOD 30 MIN: CPT | Performed by: HOSPITALIST

## 2021-03-10 PROCEDURE — U0003 INFECTIOUS AGENT DETECTION BY NUCLEIC ACID (DNA OR RNA); SEVERE ACUTE RESPIRATORY SYNDROME CORONAVIRUS 2 (SARS-COV-2) (CORONAVIRUS DISEASE [COVID-19]), AMPLIFIED PROBE TECHNIQUE, MAKING USE OF HIGH THROUGHPUT TECHNOLOGIES AS DESCRIBED BY CMS-2020-01-R: HCPCS

## 2021-03-10 PROCEDURE — 83036 HEMOGLOBIN GLYCOSYLATED A1C: CPT

## 2021-03-10 PROCEDURE — 36415 COLL VENOUS BLD VENIPUNCTURE: CPT

## 2021-03-10 PROCEDURE — 86901 BLOOD TYPING SEROLOGIC RH(D): CPT

## 2021-03-10 PROCEDURE — 85027 COMPLETE CBC AUTOMATED: CPT

## 2021-03-10 RX ORDER — CEFAZOLIN SODIUM 2 G/50ML
2 SOLUTION INTRAVENOUS
Status: DISCONTINUED | OUTPATIENT
Start: 2021-03-10 | End: 2021-03-10

## 2021-03-10 RX ORDER — BUPIVACAINE HYDROCHLORIDE AND EPINEPHRINE 2.5; 5 MG/ML; UG/ML
60 INJECTION, SOLUTION EPIDURAL; INFILTRATION; INTRACAUDAL; PERINEURAL ONCE
Status: DISCONTINUED | OUTPATIENT
Start: 2021-03-10 | End: 2021-03-10

## 2021-03-10 RX ORDER — VANCOMYCIN HYDROCHLORIDE
1.25
Status: DISCONTINUED | OUTPATIENT
Start: 2021-03-10 | End: 2021-03-10

## 2021-03-10 RX ORDER — CICLOPIROX 80 MG/ML
SOLUTION TOPICAL
Status: ON HOLD | COMMUNITY
Start: 2021-02-25 | End: 2021-03-16 | Stop reason: SDUPTHER

## 2021-03-10 RX ORDER — ACETAMINOPHEN 325 MG/1
975 TABLET ORAL ONCE
Status: DISCONTINUED | OUTPATIENT
Start: 2021-03-10 | End: 2021-03-10

## 2021-03-10 RX ORDER — CELECOXIB 200 MG/1
200 CAPSULE ORAL ONCE
Status: DISCONTINUED | OUTPATIENT
Start: 2021-03-10 | End: 2021-03-10

## 2021-03-10 ASSESSMENT — PAIN SCALES - GENERAL: PAINLEVEL: 6

## 2021-03-10 NOTE — CONSULTS
[facility] Medicine Service -  Pre-Operative Consultation       Patient Name: Maria Elena Lowry  Referring Surgeon: Eric Burks DO    Reason for Referral: Pre-Operative Evaluation  Surgical Procedure: Left Total Knee Replacement  Operative Date: 3/15/21  Other Providers:      PCP: Steven Galdamez DO   Medical Oncology: Elias Bain MD       HISTORY OF PRESENT ILLNESS      Maria Elena Lowry is a 75 y.o. female presenting today to the [facility] at [facility] for pre-operative evaluation prior to planned surgery.    I saw the patient in PAT on 11/25/20 in preparation for this surgery. Unfortunately this was cancelled due to Covid surge, but she has now been rescheduled.     Patient reports nearly 2 years of progressive left knee pain. Pain is focal to the knee and noted to be severe and has worsened since the last time she saw me. The pain is the worst at night, and wakes her from sleep. Her pain is aggravated by activity and improves with rest. She has difficulty finding a comfortable position. She tells me she has been doing better with walking recently, utilizes her cane for balance, and denies falls.  Since the last time she saw me, she is now using her cane all of the time. She has been participated in PT to prepare for surgery. She would like to proceed with joint replacement.     In regards to medical history:  - Essential Hypertension, medically managed with hctz. Regular BP checks at home, 142/70s typical for her.   - Left Breast Cancer, Stage I, diagnosed 10/17 s/p lumpectomy, radiation, and was started on anastrozole 3/29/18. Follows with Dr. Bain and Dr. Burgess  -Obesity, BMI 35  - Prediabetes, A1C 5.8 11/20    The patient denies any current or recent chest pain or pressure, dyspnea, cough, sputum, fevers, chills, abdominal pain, nausea, vomiting, diarrhea or other symptoms. She tells me other than knee pain, she feels quite well. Denies  pain in other joints.    Denies a personal or family history of easy/excessive bleeding or thrombosis.    Functionally, the patient is able to ascend a flight or so of stairs with no dyspnea or chest pain. She tells me she enjoys walking, and walks to and from work which is 1-2 blocks away. She denies any symptoms suggestive of angina.     The patient denies, on specific questioning, the following:  No history of MI, arrhythmia,or CHF.  No history of JEREMY.  No history of DVT/PE.  No history of COPD.  No history of CVA.  No history of DM.   No history of CKD.     PAST MEDICAL AND SURGICAL HISTORY      Past Medical History:   Diagnosis Date   • Arthritis    • Breast cancer (CMS/HCC) 2017    Left ( Lumpectomy and radiationn therapy)   • Estrogen receptor positive status (ER+)    • Hearing deficit    • Hypertension    • Obesity        Past Surgical History:   Procedure Laterality Date   • ANKLE LIGAMENT RECONSTRUCTION Right    • BREAST BIOPSY     • BREAST LUMPECTOMY Left    • COLONOSCOPY     • SENTINEL LYMPH NODE BIOPSY     • TONSILLECTOMY         MEDICATIONS        Current Outpatient Medications:   •  anastrozole (ARIMIDEX) 1 mg tablet, Take  1 mg daily   , Disp: , Rfl: 0  •  ciclopirox (PENLAC) 8 % solution, apply to affected area twice a day, Disp: , Rfl:   •  hydrochlorothiazide (HYDRODIURIL) 12.5 mg tablet, Take 12.5 mg by mouth daily., Disp: , Rfl:   •  ibuprofen (AdviL) 200 mg tablet, Take 200 mg by mouth as needed for mild pain., Disp: , Rfl:   •  multivitamin-min-iron-FA-vit K (MULTI FOR HER) 18 mg iron-600 mcg-40 mcg capsule, Take 1 tablet by mouth daily.  , Disp: , Rfl:   •  triamcinolone (KENALOG) 0.1 % ointment, Apply 1 application topically as needed., Disp: , Rfl:   No current facility-administered medications for this visit.     ALLERGIES      No known drug allergies    FAMILY HISTORY      family history includes Brain cancer in her biological sister; Breast cancer in her biological mother; Colon cancer  "in her biological mother; Dementia in her biological father; Diabetes in her biological brother; Heart disease in her biological brother; Hypertension in her biological brother, biological father, and biological sister.    Denies any prior known family history of DVTs/PEs/clotting disorder    SOCIAL HISTORY      Social History     Tobacco Use   • Smoking status: Never Smoker   • Smokeless tobacco: Never Used   Substance Use Topics   • Alcohol use: Yes     Binge frequency: Weekly     Comment: 2 small glasses wine/week   • Drug use: No       REVIEW OF SYSTEMS      Constitutional: Denies Fever, Chills, Fatigue, Malaise, Unintentional Weight loss  HEENT: Denies Vision changes, Epistaxis, Trouble Swallowing, Sore Throat  Respiratory: Denies Cough, Shortness of Breath, Wheezing  Cardiovascular: Denies Chest Pain, Exertional Dyspnea, Palpitations, + chronic right lower extremity swelling following knee injury and surgery > 20 years ago  GI: Denies Abdominal pain, Nausea, Vomiting, Changes in bowel movements, Blood in stool   : Denies Dysuria, Hematuria  Musculoskeletal: Denies Back pain, Neck pain, + left knee pain  Skin: Denies rash  Neuro: Denies Headache, Syncope, Weakness, Numbness  Heme: Denies Bleeding      PHYSICAL EXAMINATION      Visit Vitals  BP (!) 148/80 (BP Location: Right upper arm, Patient Position: Sitting)   Pulse 61   Temp 36.7 °C (98 °F)   Resp 16   Ht 1.575 m (5' 2\")   Wt 88.1 kg (194 lb 3.2 oz)   SpO2 97%   BMI 35.52 kg/m²     Body mass index is 35.52 kg/m².    Physical Exam   Constitutional: Well developed, Well Nourished, No apparent distress, Appears Comfortable  Neck: supple, no LAD  Cardiovascular: Normal rate, Regular Rhythm, Normal heart sounds, No murmur noted, No carotid bruits  Pulmonary: Normal effort without distress, Normal breath sounds without wheezing, rhonchi, or rales  Abdomen: Soft, no distension, nontender, normal bowel sounds  Extremities: Trace edema right leg that patient tells " me is chronic and unchanged  Neurologic: No gross abnormalities, patient able to ambulate around exam room independently  Skin: Warm, No rash  Psychiatric: Normal mood and affect      LABS / EKG        Labs  Lab Results   Component Value Date     03/10/2021    K 4.5 03/10/2021     03/10/2021    BUN 26 (H) 03/10/2021    CREATININE 0.7 03/10/2021    WBC 8.09 03/10/2021    HGB 12.3 03/10/2021    HCT 38.0 03/10/2021     (H) 03/10/2021    ALT 20 07/21/2020    AST 18 07/21/2020    HGBA1C 6.0 (H) 03/10/2021         ECG/Telemetry 11/25/20  Normal Sinus Rhythm, LAFB, present on prior EKG 10/27/17    ASSESSMENT AND PLAN         Preop examination  Medical management and enoc-operative risk commentary as noted below.      Primary localized osteoarthritis of left knee  Patient reports nearly 2 years of progressive knee pain which significantly limits her daily activities. She would like to proceed with joint replacement.     Hypertension  Her blood pressure is mildly elevated today but acceptable for upcoming surgery. She will hold her hctz on the morning of surgery. This may be resumed post-op assuming no renal dysfunction, hypotension, nor volume depletion.       Malignant neoplasm of left breast in female, estrogen receptor positive (CMS/HCC)  Diagnosed 2017, s/p lumpectomy, radiation, and has been on anastrozole for three years and will continue. She follows regularly with Dr. Bain and Dr. Burgess.     At risk for obstructive sleep apnea  The patient's STOB BANG score was elevated at 4. Please employ close monitoring including CO2 monitoring in attempt to prevent and/or manage hypercarbic respiratory failure.  I would recommend use of our JEREMY protocol as JEREMY places the patient at relatively increased risk compared to a population without this diagnosis of oxygen desaturation, cardiac events, acute respiratory failure, or an ICU transfer.       Prediabetes  The patient may have a brisk physiologic hyperglycemia  response to surgery. If glucose values are persistently > 180, monitor glucose as the patient may transiently require insulin to assist with optimizing glycemic control.          In regards to perioperative cardiac risk:  The patient denies any history of ischemic heart disease, denies any history of CHF, denies any history of CVA, is not on pre-operative treatment with insulin, and does not have a pre-operative creatinine > 2 mg/dL.   The Revised Cardiac Risk Index (RCRI) for this patient indicates 0.4% risk of cardiac death, nonfatal MI, and nonfatal cardiac arrest.    Further comments:  Resume supplements when OK with surgical team.  I would encourage incentive spirometry to assist with minimizing enoc-operative pulmonary risk.  DVT prophylaxis and timing of such per the discretion of Dr. Burks.     Please do not hesitate to contact [facility] during the upcoming hospitalization with any questions or concerns.     Lacey Norris MD  3/10/2021

## 2021-03-10 NOTE — ASSESSMENT & PLAN NOTE
Her blood pressure is mildly elevated today but acceptable for upcoming surgery. She will hold her hctz on the morning of surgery. This may be resumed post-op assuming no renal dysfunction, hypotension, nor volume depletion.

## 2021-03-10 NOTE — ASSESSMENT & PLAN NOTE
Patient reports nearly 2 years of progressive knee pain which significantly limits her daily activities. She would like to proceed with joint replacement.

## 2021-03-10 NOTE — PRE-PROCEDURE INSTRUCTIONS
1. We will call you between 3 pm and 7 pm on March 12, 2021 to determine that arrival time for your procedure. If you do not hear by  4PM. Please call [phone] for arrival time.      2. Please report to Main Entrance near Parking lot A, walk into main lobby and report to the admission desk on the first floor on the day of your procedure.       3. Please follow the following fasting guidelines:     No solids for EIGHT HOURS prior to arrival   Unlimited clear liquids, meaning water or PLAIN black coffee WITHOUT any milk or cream, are permitted up to TWO HOURS prior to arrival at the hospital.     4.   Take no medications day of surgery   5. Other Instructions: You may brush your teeth the morning of the procedure. Rinse and spit, do not swallow.  Bring a list of your medications with dosages with you.  Use surgical wash as directed.      6. If you develop a cold, cough, fever, rash, or other symptom prior to the data of the procedure, please report it to your physician immediately.     7. If you need to cancel the procedure for any reason, please contact your physician or call the unit listed above.   8. Make arrangements to have someone drive you home from the procedure. If you have not arranged for transportation home, your surgery may be cancelled.    9. You may not take public transportation unless accompanied by a responsible person.   10. You may not drive a car or operate complex or potentially dangerous machinery for 24 hours following anesthesia and/or sedation.     11. If it is medically necessary for you to have a longer stay, you will be informed as soon as the decision is made.   12. Do not wear or bring anything of value to the hospital including jewelry of any kind, money, or wallet. Do not wear make-up or contact lenses. DO bring your glasses and a case. DO NOT BRING MEDICATIONS FROM HOME.     13. No lotion, creams, powders, or oils on skin the morning of procedure    14. Dress in comfortable  clothes.     15.  If instructed, please bring a copy of your Advanced Directive (Living Will/Durable Power of ) on the day of your procedure.      Pre operative instructions given as per protocol.  Form explained by: ELLIE Bear     I have read and understand the above information. I have had sufficient opportunity to ask questions I might have and they have been answered to my satisfaction. I agree to comply with the Patient Responsibilities listed above and have received a copy of this form.

## 2021-03-10 NOTE — H&P (VIEW-ONLY)
[facility] Medicine Service -  Pre-Operative Consultation       Patient Name: Maria Elena Lowry  Referring Surgeon: Eric Burks DO    Reason for Referral: Pre-Operative Evaluation  Surgical Procedure: Left Total Knee Replacement  Operative Date: 3/15/21  Other Providers:      PCP: Steven Galdamez DO   Medical Oncology: Elias Bain MD       HISTORY OF PRESENT ILLNESS      Maria Elena Lowry is a 75 y.o. female presenting today to the [facility] at [facility] for pre-operative evaluation prior to planned surgery.    I saw the patient in PAT on 11/25/20 in preparation for this surgery. Unfortunately this was cancelled due to Covid surge, but she has now been rescheduled.     Patient reports nearly 2 years of progressive left knee pain. Pain is focal to the knee and noted to be severe and has worsened since the last time she saw me. The pain is the worst at night, and wakes her from sleep. Her pain is aggravated by activity and improves with rest. She has difficulty finding a comfortable position. She tells me she has been doing better with walking recently, utilizes her cane for balance, and denies falls.  Since the last time she saw me, she is now using her cane all of the time. She has been participated in PT to prepare for surgery. She would like to proceed with joint replacement.     In regards to medical history:  - Essential Hypertension, medically managed with hctz. Regular BP checks at home, 142/70s typical for her.   - Left Breast Cancer, Stage I, diagnosed 10/17 s/p lumpectomy, radiation, and was started on anastrozole 3/29/18. Follows with Dr. Bain and Dr. Burgess  -Obesity, BMI 35  - Prediabetes, A1C 5.8 11/20    The patient denies any current or recent chest pain or pressure, dyspnea, cough, sputum, fevers, chills, abdominal pain, nausea, vomiting, diarrhea or other symptoms. She tells me other than knee pain, she feels quite well. Denies  pain in other joints.    Denies a personal or family history of easy/excessive bleeding or thrombosis.    Functionally, the patient is able to ascend a flight or so of stairs with no dyspnea or chest pain. She tells me she enjoys walking, and walks to and from work which is 1-2 blocks away. She denies any symptoms suggestive of angina.     The patient denies, on specific questioning, the following:  No history of MI, arrhythmia,or CHF.  No history of JEREMY.  No history of DVT/PE.  No history of COPD.  No history of CVA.  No history of DM.   No history of CKD.     PAST MEDICAL AND SURGICAL HISTORY      Past Medical History:   Diagnosis Date   • Arthritis    • Breast cancer (CMS/HCC) 2017    Left ( Lumpectomy and radiationn therapy)   • Estrogen receptor positive status (ER+)    • Hearing deficit    • Hypertension    • Obesity        Past Surgical History:   Procedure Laterality Date   • ANKLE LIGAMENT RECONSTRUCTION Right    • BREAST BIOPSY     • BREAST LUMPECTOMY Left    • COLONOSCOPY     • SENTINEL LYMPH NODE BIOPSY     • TONSILLECTOMY         MEDICATIONS        Current Outpatient Medications:   •  anastrozole (ARIMIDEX) 1 mg tablet, Take  1 mg daily   , Disp: , Rfl: 0  •  ciclopirox (PENLAC) 8 % solution, apply to affected area twice a day, Disp: , Rfl:   •  hydrochlorothiazide (HYDRODIURIL) 12.5 mg tablet, Take 12.5 mg by mouth daily., Disp: , Rfl:   •  ibuprofen (AdviL) 200 mg tablet, Take 200 mg by mouth as needed for mild pain., Disp: , Rfl:   •  multivitamin-min-iron-FA-vit K (MULTI FOR HER) 18 mg iron-600 mcg-40 mcg capsule, Take 1 tablet by mouth daily.  , Disp: , Rfl:   •  triamcinolone (KENALOG) 0.1 % ointment, Apply 1 application topically as needed., Disp: , Rfl:   No current facility-administered medications for this visit.     ALLERGIES      No known drug allergies    FAMILY HISTORY      family history includes Brain cancer in her biological sister; Breast cancer in her biological mother; Colon cancer  "in her biological mother; Dementia in her biological father; Diabetes in her biological brother; Heart disease in her biological brother; Hypertension in her biological brother, biological father, and biological sister.    Denies any prior known family history of DVTs/PEs/clotting disorder    SOCIAL HISTORY      Social History     Tobacco Use   • Smoking status: Never Smoker   • Smokeless tobacco: Never Used   Substance Use Topics   • Alcohol use: Yes     Binge frequency: Weekly     Comment: 2 small glasses wine/week   • Drug use: No       REVIEW OF SYSTEMS      Constitutional: Denies Fever, Chills, Fatigue, Malaise, Unintentional Weight loss  HEENT: Denies Vision changes, Epistaxis, Trouble Swallowing, Sore Throat  Respiratory: Denies Cough, Shortness of Breath, Wheezing  Cardiovascular: Denies Chest Pain, Exertional Dyspnea, Palpitations, + chronic right lower extremity swelling following knee injury and surgery > 20 years ago  GI: Denies Abdominal pain, Nausea, Vomiting, Changes in bowel movements, Blood in stool   : Denies Dysuria, Hematuria  Musculoskeletal: Denies Back pain, Neck pain, + left knee pain  Skin: Denies rash  Neuro: Denies Headache, Syncope, Weakness, Numbness  Heme: Denies Bleeding      PHYSICAL EXAMINATION      Visit Vitals  BP (!) 148/80 (BP Location: Right upper arm, Patient Position: Sitting)   Pulse 61   Temp 36.7 °C (98 °F)   Resp 16   Ht 1.575 m (5' 2\")   Wt 88.1 kg (194 lb 3.2 oz)   SpO2 97%   BMI 35.52 kg/m²     Body mass index is 35.52 kg/m².    Physical Exam   Constitutional: Well developed, Well Nourished, No apparent distress, Appears Comfortable  Neck: supple, no LAD  Cardiovascular: Normal rate, Regular Rhythm, Normal heart sounds, No murmur noted, No carotid bruits  Pulmonary: Normal effort without distress, Normal breath sounds without wheezing, rhonchi, or rales  Abdomen: Soft, no distension, nontender, normal bowel sounds  Extremities: Trace edema right leg that patient tells " me is chronic and unchanged  Neurologic: No gross abnormalities, patient able to ambulate around exam room independently  Skin: Warm, No rash  Psychiatric: Normal mood and affect      LABS / EKG        Labs  Lab Results   Component Value Date     03/10/2021    K 4.5 03/10/2021     03/10/2021    BUN 26 (H) 03/10/2021    CREATININE 0.7 03/10/2021    WBC 8.09 03/10/2021    HGB 12.3 03/10/2021    HCT 38.0 03/10/2021     (H) 03/10/2021    ALT 20 07/21/2020    AST 18 07/21/2020    HGBA1C 6.0 (H) 03/10/2021         ECG/Telemetry 11/25/20  Normal Sinus Rhythm, LAFB, present on prior EKG 10/27/17    ASSESSMENT AND PLAN         Preop examination  Medical management and enoc-operative risk commentary as noted below.      Primary localized osteoarthritis of left knee  Patient reports nearly 2 years of progressive knee pain which significantly limits her daily activities. She would like to proceed with joint replacement.     Hypertension  Her blood pressure is mildly elevated today but acceptable for upcoming surgery. She will hold her hctz on the morning of surgery. This may be resumed post-op assuming no renal dysfunction, hypotension, nor volume depletion.       Malignant neoplasm of left breast in female, estrogen receptor positive (CMS/HCC)  Diagnosed 2017, s/p lumpectomy, radiation, and has been on anastrozole for three years and will continue. She follows regularly with Dr. Bain and Dr. Burgess.     At risk for obstructive sleep apnea  The patient's STOB BANG score was elevated at 4. Please employ close monitoring including CO2 monitoring in attempt to prevent and/or manage hypercarbic respiratory failure.  I would recommend use of our JEREMY protocol as JEREMY places the patient at relatively increased risk compared to a population without this diagnosis of oxygen desaturation, cardiac events, acute respiratory failure, or an ICU transfer.       Prediabetes  The patient may have a brisk physiologic hyperglycemia  response to surgery. If glucose values are persistently > 180, monitor glucose as the patient may transiently require insulin to assist with optimizing glycemic control.          In regards to perioperative cardiac risk:  The patient denies any history of ischemic heart disease, denies any history of CHF, denies any history of CVA, is not on pre-operative treatment with insulin, and does not have a pre-operative creatinine > 2 mg/dL.   The Revised Cardiac Risk Index (RCRI) for this patient indicates 0.4% risk of cardiac death, nonfatal MI, and nonfatal cardiac arrest.    Further comments:  Resume supplements when OK with surgical team.  I would encourage incentive spirometry to assist with minimizing enoc-operative pulmonary risk.  DVT prophylaxis and timing of such per the discretion of Dr. Burks.     Please do not hesitate to contact [facility] during the upcoming hospitalization with any questions or concerns.     Lacey Norris MD  3/10/2021

## 2021-03-10 NOTE — ASSESSMENT & PLAN NOTE
Diagnosed 2017, s/p lumpectomy, radiation, and has been on anastrozole for three years and will continue. She follows regularly with Dr. Bain and Dr. Burgess.

## 2021-03-12 ENCOUNTER — ANESTHESIA EVENT (OUTPATIENT)
Dept: OPERATING ROOM | Facility: HOSPITAL | Age: 76
Setting detail: SURGERY ADMIT
DRG: 470 | End: 2021-03-12
Payer: COMMERCIAL

## 2021-03-12 NOTE — ANESTHESIA PREPROCEDURE EVALUATION
Relevant Problems   CARDIOVASCULAR   (+) Hypertension      MUSCULOSKELETAL   (+) Primary localized osteoarthritis of left knee      Other   (+) Malignant neoplasm of left breast in female, estrogen receptor positive (CMS/HCC)       Anesthesia ROS/MED HX    Anesthesia History - neg  Pulmonary - neg  Neuro/Psych - neg  Cardiovascular   hypertension   Covid19 Test Reviewed  Hematological - neg  GI/Hepatic- neg  Musculoskeletal- neg  Renal Disease- neg  Endo/Other- neg       Past Surgical History:   Procedure Laterality Date   • ANKLE LIGAMENT RECONSTRUCTION Right    • BREAST BIOPSY     • BREAST LUMPECTOMY Left    • COLONOSCOPY     • SENTINEL LYMPH NODE BIOPSY     • TONSILLECTOMY         Physical Exam    Airway   Mallampati: II   TM distance: >3 FB   Neck ROM: full  Cardiovascular - normal   Rhythm: regular   Rate: normalPulmonary - normal   clear to auscultation  Dental - normal        Anesthesia Plan    Plan: spinal    Technique: spinal   ASA 2  Blood Products:     Use of Blood Products Discussed: Yes     Consented to blood products  Anesthetic plan and risks discussed with: patient  Induction:    intravenous   Postop Plan:   Patient Disposition: phase II then home   Pain Management: IV analgesics

## 2021-03-15 ENCOUNTER — HOSPITAL ENCOUNTER (INPATIENT)
Facility: HOSPITAL | Age: 76
LOS: 1 days | Discharge: HOME | DRG: 470 | End: 2021-03-16
Attending: ORTHOPAEDIC SURGERY | Admitting: ORTHOPAEDIC SURGERY
Payer: COMMERCIAL

## 2021-03-15 ENCOUNTER — ANESTHESIA (OUTPATIENT)
Dept: OPERATING ROOM | Facility: HOSPITAL | Age: 76
Setting detail: SURGERY ADMIT
DRG: 470 | End: 2021-03-15
Payer: COMMERCIAL

## 2021-03-15 ENCOUNTER — APPOINTMENT (INPATIENT)
Dept: RADIOLOGY | Facility: HOSPITAL | Age: 76
DRG: 470 | End: 2021-03-15
Attending: PHYSICIAN ASSISTANT
Payer: COMMERCIAL

## 2021-03-15 PROBLEM — M17.9 DJD (DEGENERATIVE JOINT DISEASE) OF KNEE: Status: ACTIVE | Noted: 2021-03-15

## 2021-03-15 PROBLEM — M17.9 OA (OSTEOARTHRITIS) OF KNEE: Status: ACTIVE | Noted: 2021-03-15

## 2021-03-15 PROCEDURE — 71000011 HC PACU PHASE 1 EA ADDL MIN: Performed by: ORTHOPAEDIC SURGERY

## 2021-03-15 PROCEDURE — C1776 JOINT DEVICE (IMPLANTABLE): HCPCS | Performed by: ORTHOPAEDIC SURGERY

## 2021-03-15 PROCEDURE — 73560 X-RAY EXAM OF KNEE 1 OR 2: CPT | Mod: LT

## 2021-03-15 PROCEDURE — 63600000 HC DRUGS/DETAIL CODE: Performed by: PHYSICIAN ASSISTANT

## 2021-03-15 PROCEDURE — 36000006 HC OR LEVEL 6 INITIAL 30MIN: Performed by: ORTHOPAEDIC SURGERY

## 2021-03-15 PROCEDURE — 25000000 HC PHARMACY GENERAL: Performed by: PHYSICIAN ASSISTANT

## 2021-03-15 PROCEDURE — 63600000 HC DRUGS/DETAIL CODE: Performed by: NURSE ANESTHETIST, CERTIFIED REGISTERED

## 2021-03-15 PROCEDURE — 63700000 HC SELF-ADMINISTRABLE DRUG: Performed by: PHYSICIAN ASSISTANT

## 2021-03-15 PROCEDURE — 25800000 HC PHARMACY IV SOLUTIONS: Performed by: NURSE ANESTHETIST, CERTIFIED REGISTERED

## 2021-03-15 PROCEDURE — 27200000 HC STERILE SUPPLY: Performed by: ORTHOPAEDIC SURGERY

## 2021-03-15 PROCEDURE — 25800000 HC PHARMACY IV SOLUTIONS: Performed by: PHYSICIAN ASSISTANT

## 2021-03-15 PROCEDURE — 25000000 HC PHARMACY GENERAL: Performed by: NURSE ANESTHETIST, CERTIFIED REGISTERED

## 2021-03-15 PROCEDURE — 37000010 HC ANESTHESIA SPINAL: Performed by: ORTHOPAEDIC SURGERY

## 2021-03-15 PROCEDURE — 25000000 HC PHARMACY GENERAL: Performed by: ORTHOPAEDIC SURGERY

## 2021-03-15 PROCEDURE — 25000000 HC PHARMACY GENERAL: Performed by: STUDENT IN AN ORGANIZED HEALTH CARE EDUCATION/TRAINING PROGRAM

## 2021-03-15 PROCEDURE — 97162 PT EVAL MOD COMPLEX 30 MIN: CPT | Mod: GP

## 2021-03-15 PROCEDURE — 36000016 HC OR LEVEL 6 EA ADDL MIN: Performed by: ORTHOPAEDIC SURGERY

## 2021-03-15 PROCEDURE — 63700000 HC SELF-ADMINISTRABLE DRUG: Performed by: STUDENT IN AN ORGANIZED HEALTH CARE EDUCATION/TRAINING PROGRAM

## 2021-03-15 PROCEDURE — 12000000 HC ROOM AND CARE MED/SURG

## 2021-03-15 PROCEDURE — C1713 ANCHOR/SCREW BN/BN,TIS/BN: HCPCS | Performed by: ORTHOPAEDIC SURGERY

## 2021-03-15 PROCEDURE — 0SRD0J9 REPLACEMENT OF LEFT KNEE JOINT WITH SYNTHETIC SUBSTITUTE, CEMENTED, OPEN APPROACH: ICD-10-PCS | Performed by: ORTHOPAEDIC SURGERY

## 2021-03-15 PROCEDURE — 99233 SBSQ HOSP IP/OBS HIGH 50: CPT | Performed by: STUDENT IN AN ORGANIZED HEALTH CARE EDUCATION/TRAINING PROGRAM

## 2021-03-15 PROCEDURE — 71000001 HC PACU PHASE 1 INITIAL 30MIN: Performed by: ORTHOPAEDIC SURGERY

## 2021-03-15 DEVICE — TIBIAL BEARING INSERT - CS
Type: IMPLANTABLE DEVICE | Site: KNEE | Status: FUNCTIONAL
Brand: TRIATHLON

## 2021-03-15 DEVICE — CRUCIATE RETAINING FEMORAL
Type: IMPLANTABLE DEVICE | Site: KNEE | Status: FUNCTIONAL
Brand: TRIATHLON

## 2021-03-15 DEVICE — PRIMARY TIBIAL BASEPLATE
Type: IMPLANTABLE DEVICE | Site: KNEE | Status: FUNCTIONAL
Brand: TRIATHLON

## 2021-03-15 DEVICE — ASYMMETRIC PATELLA
Type: IMPLANTABLE DEVICE | Site: KNEE | Status: FUNCTIONAL
Brand: TRIATHLON

## 2021-03-15 DEVICE — CEMENT BONE SIMPLEX FULL DOSE: Type: IMPLANTABLE DEVICE | Site: KNEE | Status: FUNCTIONAL

## 2021-03-15 RX ORDER — OXYCODONE HYDROCHLORIDE 5 MG/1
5 TABLET ORAL EVERY 4 HOURS PRN
Status: DISCONTINUED | OUTPATIENT
Start: 2021-03-15 | End: 2021-03-15

## 2021-03-15 RX ORDER — ASPIRIN 81 MG/1
81 TABLET ORAL 2 TIMES DAILY
Status: DISCONTINUED | OUTPATIENT
Start: 2021-03-15 | End: 2021-03-16 | Stop reason: HOSPADM

## 2021-03-15 RX ORDER — PROPOFOL 10 MG/ML
INJECTION, EMULSION INTRAVENOUS AS NEEDED
Status: DISCONTINUED | OUTPATIENT
Start: 2021-03-15 | End: 2021-03-15 | Stop reason: SURG

## 2021-03-15 RX ORDER — BUPIVACAINE HYDROCHLORIDE 7.5 MG/ML
INJECTION, SOLUTION INTRASPINAL
Status: COMPLETED | OUTPATIENT
Start: 2021-03-15 | End: 2021-03-15

## 2021-03-15 RX ORDER — FAMOTIDINE 10 MG/ML
INJECTION INTRAVENOUS AS NEEDED
Status: DISCONTINUED | OUTPATIENT
Start: 2021-03-15 | End: 2021-03-15 | Stop reason: SURG

## 2021-03-15 RX ORDER — DEXAMETHASONE SODIUM PHOSPHATE 4 MG/ML
6 INJECTION, SOLUTION INTRA-ARTICULAR; INTRALESIONAL; INTRAMUSCULAR; INTRAVENOUS; SOFT TISSUE ONCE
Status: COMPLETED | OUTPATIENT
Start: 2021-03-16 | End: 2021-03-16

## 2021-03-15 RX ORDER — BUPIVACAINE HYDROCHLORIDE AND EPINEPHRINE 2.5; 5 MG/ML; UG/ML
60 INJECTION, SOLUTION EPIDURAL; INFILTRATION; INTRACAUDAL; PERINEURAL ONCE
Status: DISPENSED | OUTPATIENT
Start: 2021-03-15 | End: 2021-03-15

## 2021-03-15 RX ORDER — VANCOMYCIN HYDROCHLORIDE
1.25
Status: COMPLETED | OUTPATIENT
Start: 2021-03-15 | End: 2021-03-15

## 2021-03-15 RX ORDER — DEXTROSE 50 % IN WATER (D50W) INTRAVENOUS SYRINGE
25 AS NEEDED
Status: DISCONTINUED | OUTPATIENT
Start: 2021-03-15 | End: 2021-03-15 | Stop reason: HOSPADM

## 2021-03-15 RX ORDER — POLYETHYLENE GLYCOL 3350 17 G/17G
17 POWDER, FOR SOLUTION ORAL DAILY
Status: DISCONTINUED | OUTPATIENT
Start: 2021-03-15 | End: 2021-03-16 | Stop reason: HOSPADM

## 2021-03-15 RX ORDER — ONDANSETRON HYDROCHLORIDE 2 MG/ML
4 INJECTION, SOLUTION INTRAVENOUS
Status: DISCONTINUED | OUTPATIENT
Start: 2021-03-15 | End: 2021-03-15 | Stop reason: HOSPADM

## 2021-03-15 RX ORDER — BISACODYL 10 MG/1
10 SUPPOSITORY RECTAL DAILY PRN
Status: DISCONTINUED | OUTPATIENT
Start: 2021-03-15 | End: 2021-03-16 | Stop reason: HOSPADM

## 2021-03-15 RX ORDER — ONDANSETRON HYDROCHLORIDE 2 MG/ML
INJECTION, SOLUTION INTRAVENOUS AS NEEDED
Status: DISCONTINUED | OUTPATIENT
Start: 2021-03-15 | End: 2021-03-15 | Stop reason: SURG

## 2021-03-15 RX ORDER — ACETAMINOPHEN 325 MG/1
975 TABLET ORAL
Status: DISCONTINUED | OUTPATIENT
Start: 2021-03-15 | End: 2021-03-16 | Stop reason: HOSPADM

## 2021-03-15 RX ORDER — OXYCODONE HYDROCHLORIDE 5 MG/1
10 TABLET ORAL EVERY 4 HOURS PRN
Status: DISCONTINUED | OUTPATIENT
Start: 2021-03-15 | End: 2021-03-15

## 2021-03-15 RX ORDER — LIDOCAINE HYDROCHLORIDE 10 MG/ML
INJECTION, SOLUTION INFILTRATION; PERINEURAL AS NEEDED
Status: DISCONTINUED | OUTPATIENT
Start: 2021-03-15 | End: 2021-03-15 | Stop reason: SURG

## 2021-03-15 RX ORDER — HYDROCHLOROTHIAZIDE 12.5 MG/1
12.5 TABLET ORAL DAILY
Status: DISCONTINUED | OUTPATIENT
Start: 2021-03-16 | End: 2021-03-15

## 2021-03-15 RX ORDER — OXYCODONE HYDROCHLORIDE 5 MG/1
5 TABLET ORAL EVERY 4 HOURS PRN
Status: DISCONTINUED | OUTPATIENT
Start: 2021-03-15 | End: 2021-03-16 | Stop reason: HOSPADM

## 2021-03-15 RX ORDER — KETOROLAC TROMETHAMINE 15 MG/ML
7.5 INJECTION, SOLUTION INTRAMUSCULAR; INTRAVENOUS
Status: DISCONTINUED | OUTPATIENT
Start: 2021-03-15 | End: 2021-03-16 | Stop reason: HOSPADM

## 2021-03-15 RX ORDER — HYDROCHLOROTHIAZIDE 12.5 MG/1
12.5 TABLET ORAL DAILY
Status: DISCONTINUED | OUTPATIENT
Start: 2021-03-15 | End: 2021-03-16 | Stop reason: HOSPADM

## 2021-03-15 RX ORDER — OXYCODONE HYDROCHLORIDE 5 MG/1
5 TABLET ORAL ONCE AS NEEDED
Status: DISCONTINUED | OUTPATIENT
Start: 2021-03-15 | End: 2021-03-15 | Stop reason: HOSPADM

## 2021-03-15 RX ORDER — ONDANSETRON 4 MG/1
4 TABLET, ORALLY DISINTEGRATING ORAL EVERY 8 HOURS PRN
Status: DISCONTINUED | OUTPATIENT
Start: 2021-03-15 | End: 2021-03-16 | Stop reason: HOSPADM

## 2021-03-15 RX ORDER — GLYCOPYRROLATE 0.6MG/3ML
SYRINGE (ML) INTRAVENOUS AS NEEDED
Status: DISCONTINUED | OUTPATIENT
Start: 2021-03-15 | End: 2021-03-15 | Stop reason: SURG

## 2021-03-15 RX ORDER — IBUPROFEN 200 MG
16-32 TABLET ORAL AS NEEDED
Status: DISCONTINUED | OUTPATIENT
Start: 2021-03-15 | End: 2021-03-16 | Stop reason: HOSPADM

## 2021-03-15 RX ORDER — ONDANSETRON HYDROCHLORIDE 2 MG/ML
4 INJECTION, SOLUTION INTRAVENOUS EVERY 8 HOURS PRN
Status: DISCONTINUED | OUTPATIENT
Start: 2021-03-15 | End: 2021-03-16 | Stop reason: HOSPADM

## 2021-03-15 RX ORDER — DIPHENHYDRAMINE HCL 25 MG
25 CAPSULE ORAL EVERY 6 HOURS PRN
Status: DISCONTINUED | OUTPATIENT
Start: 2021-03-15 | End: 2021-03-16 | Stop reason: HOSPADM

## 2021-03-15 RX ORDER — DEXTROSE 50 % IN WATER (D50W) INTRAVENOUS SYRINGE
25 AS NEEDED
Status: DISCONTINUED | OUTPATIENT
Start: 2021-03-15 | End: 2021-03-16 | Stop reason: HOSPADM

## 2021-03-15 RX ORDER — TRAMADOL HYDROCHLORIDE 50 MG/1
50 TABLET ORAL EVERY 6 HOURS PRN
Status: DISCONTINUED | OUTPATIENT
Start: 2021-03-15 | End: 2021-03-16 | Stop reason: HOSPADM

## 2021-03-15 RX ORDER — KETAMINE HYDROCHLORIDE 10 MG/ML
INJECTION, SOLUTION INTRAMUSCULAR; INTRAVENOUS AS NEEDED
Status: DISCONTINUED | OUTPATIENT
Start: 2021-03-15 | End: 2021-03-15 | Stop reason: SURG

## 2021-03-15 RX ORDER — PROPOFOL 10 MG/ML
INJECTION, EMULSION INTRAVENOUS CONTINUOUS PRN
Status: DISCONTINUED | OUTPATIENT
Start: 2021-03-15 | End: 2021-03-15 | Stop reason: SURG

## 2021-03-15 RX ORDER — PHENYLEPHRINE HYDROCHLORIDE 10 MG/ML
INJECTION INTRAVENOUS AS NEEDED
Status: DISCONTINUED | OUTPATIENT
Start: 2021-03-15 | End: 2021-03-15 | Stop reason: SURG

## 2021-03-15 RX ORDER — SENNOSIDES 8.6 MG/1
1 TABLET ORAL 2 TIMES DAILY PRN
Status: DISCONTINUED | OUTPATIENT
Start: 2021-03-15 | End: 2021-03-16 | Stop reason: HOSPADM

## 2021-03-15 RX ORDER — IBUPROFEN 200 MG
16-32 TABLET ORAL AS NEEDED
Status: DISCONTINUED | OUTPATIENT
Start: 2021-03-15 | End: 2021-03-15 | Stop reason: HOSPADM

## 2021-03-15 RX ORDER — ALUMINUM HYDROXIDE, MAGNESIUM HYDROXIDE, AND SIMETHICONE 1200; 120; 1200 MG/30ML; MG/30ML; MG/30ML
30 SUSPENSION ORAL EVERY 4 HOURS PRN
Status: DISCONTINUED | OUTPATIENT
Start: 2021-03-15 | End: 2021-03-16 | Stop reason: HOSPADM

## 2021-03-15 RX ORDER — CEFAZOLIN SODIUM 2 G/50ML
2 SOLUTION INTRAVENOUS
Status: COMPLETED | OUTPATIENT
Start: 2021-03-15 | End: 2021-03-15

## 2021-03-15 RX ORDER — DEXAMETHASONE SODIUM PHOSPHATE 4 MG/ML
INJECTION, SOLUTION INTRA-ARTICULAR; INTRALESIONAL; INTRAMUSCULAR; INTRAVENOUS; SOFT TISSUE AS NEEDED
Status: DISCONTINUED | OUTPATIENT
Start: 2021-03-15 | End: 2021-03-15 | Stop reason: SURG

## 2021-03-15 RX ORDER — DEXTROSE 40 %
15-30 GEL (GRAM) ORAL AS NEEDED
Status: DISCONTINUED | OUTPATIENT
Start: 2021-03-15 | End: 2021-03-15 | Stop reason: HOSPADM

## 2021-03-15 RX ORDER — AMOXICILLIN 250 MG
1 CAPSULE ORAL 2 TIMES DAILY
Status: DISCONTINUED | OUTPATIENT
Start: 2021-03-15 | End: 2021-03-16 | Stop reason: HOSPADM

## 2021-03-15 RX ORDER — DEXTROSE 40 %
15-30 GEL (GRAM) ORAL AS NEEDED
Status: DISCONTINUED | OUTPATIENT
Start: 2021-03-15 | End: 2021-03-16 | Stop reason: HOSPADM

## 2021-03-15 RX ORDER — NIFEDIPINE 30 MG/1
30 TABLET, FILM COATED, EXTENDED RELEASE ORAL DAILY
Status: DISCONTINUED | OUTPATIENT
Start: 2021-03-15 | End: 2021-03-16 | Stop reason: HOSPADM

## 2021-03-15 RX ORDER — ONDANSETRON 4 MG/1
4 TABLET, ORALLY DISINTEGRATING ORAL
Status: DISCONTINUED | OUTPATIENT
Start: 2021-03-16 | End: 2021-03-16 | Stop reason: HOSPADM

## 2021-03-15 RX ORDER — METOCLOPRAMIDE HYDROCHLORIDE 5 MG/ML
INJECTION INTRAMUSCULAR; INTRAVENOUS AS NEEDED
Status: DISCONTINUED | OUTPATIENT
Start: 2021-03-15 | End: 2021-03-15 | Stop reason: SURG

## 2021-03-15 RX ORDER — DIPHENHYDRAMINE HCL 50 MG/ML
25 VIAL (ML) INJECTION EVERY 6 HOURS PRN
Status: DISCONTINUED | OUTPATIENT
Start: 2021-03-15 | End: 2021-03-16 | Stop reason: HOSPADM

## 2021-03-15 RX ORDER — BUPIVACAINE HYDROCHLORIDE AND EPINEPHRINE 2.5; 5 MG/ML; UG/ML
INJECTION, SOLUTION EPIDURAL; INFILTRATION; INTRACAUDAL; PERINEURAL AS NEEDED
Status: DISCONTINUED | OUTPATIENT
Start: 2021-03-15 | End: 2021-03-15 | Stop reason: HOSPADM

## 2021-03-15 RX ORDER — SODIUM CHLORIDE 9 MG/ML
INJECTION, SOLUTION INTRAVENOUS CONTINUOUS PRN
Status: DISCONTINUED | OUTPATIENT
Start: 2021-03-15 | End: 2021-03-15 | Stop reason: SURG

## 2021-03-15 RX ORDER — SODIUM CHLORIDE, SODIUM LACTATE, POTASSIUM CHLORIDE, CALCIUM CHLORIDE 600; 310; 30; 20 MG/100ML; MG/100ML; MG/100ML; MG/100ML
INJECTION, SOLUTION INTRAVENOUS CONTINUOUS
Status: ACTIVE | OUTPATIENT
Start: 2021-03-15 | End: 2021-03-16

## 2021-03-15 RX ORDER — CELECOXIB 200 MG/1
200 CAPSULE ORAL DAILY
Status: DISCONTINUED | OUTPATIENT
Start: 2021-03-16 | End: 2021-03-16 | Stop reason: HOSPADM

## 2021-03-15 RX ORDER — CELECOXIB 200 MG/1
200 CAPSULE ORAL ONCE
Status: COMPLETED | OUTPATIENT
Start: 2021-03-15 | End: 2021-03-15

## 2021-03-15 RX ORDER — MIDAZOLAM HYDROCHLORIDE 2 MG/2ML
INJECTION, SOLUTION INTRAMUSCULAR; INTRAVENOUS AS NEEDED
Status: DISCONTINUED | OUTPATIENT
Start: 2021-03-15 | End: 2021-03-15 | Stop reason: SURG

## 2021-03-15 RX ORDER — HYDROMORPHONE HYDROCHLORIDE 1 MG/ML
0.5 INJECTION, SOLUTION INTRAMUSCULAR; INTRAVENOUS; SUBCUTANEOUS
Status: DISCONTINUED | OUTPATIENT
Start: 2021-03-15 | End: 2021-03-16 | Stop reason: HOSPADM

## 2021-03-15 RX ORDER — ACETAMINOPHEN 325 MG/1
975 TABLET ORAL ONCE
Status: COMPLETED | OUTPATIENT
Start: 2021-03-15 | End: 2021-03-15

## 2021-03-15 RX ORDER — FENTANYL CITRATE 50 UG/ML
50 INJECTION, SOLUTION INTRAMUSCULAR; INTRAVENOUS
Status: DISCONTINUED | OUTPATIENT
Start: 2021-03-15 | End: 2021-03-15 | Stop reason: HOSPADM

## 2021-03-15 RX ORDER — KETOROLAC TROMETHAMINE 30 MG/ML
INJECTION, SOLUTION INTRAMUSCULAR; INTRAVENOUS AS NEEDED
Status: DISCONTINUED | OUTPATIENT
Start: 2021-03-15 | End: 2021-03-15 | Stop reason: SURG

## 2021-03-15 RX ORDER — ACETAMINOPHEN 325 MG/1
650 TABLET ORAL ONCE AS NEEDED
Status: DISCONTINUED | OUTPATIENT
Start: 2021-03-15 | End: 2021-03-15 | Stop reason: HOSPADM

## 2021-03-15 RX ORDER — OXYCODONE HYDROCHLORIDE 5 MG/1
10 TABLET ORAL EVERY 4 HOURS PRN
Status: DISCONTINUED | OUTPATIENT
Start: 2021-03-15 | End: 2021-03-16 | Stop reason: HOSPADM

## 2021-03-15 RX ADMIN — MIDAZOLAM HYDROCHLORIDE 1 MG: 1 INJECTION, SOLUTION INTRAMUSCULAR; INTRAVENOUS at 07:41

## 2021-03-15 RX ADMIN — CEFAZOLIN SODIUM 2 G: 2 SOLUTION INTRAVENOUS at 22:33

## 2021-03-15 RX ADMIN — Medication 20 MG: at 07:42

## 2021-03-15 RX ADMIN — FAMOTIDINE 20 MG: 10 INJECTION, SOLUTION INTRAVENOUS at 07:35

## 2021-03-15 RX ADMIN — PROPOFOL INJECTABLE EMULSION 20 MG: 10 INJECTION, EMULSION INTRAVENOUS at 07:34

## 2021-03-15 RX ADMIN — PHENYLEPHRINE HYDROCHLORIDE 50 MCG: 10 INJECTION INTRAVENOUS at 08:46

## 2021-03-15 RX ADMIN — ACETAMINOPHEN 975 MG: 325 TABLET, FILM COATED ORAL at 13:06

## 2021-03-15 RX ADMIN — KETOROLAC TROMETHAMINE 7.5 MG: 15 INJECTION, SOLUTION INTRAMUSCULAR; INTRAVENOUS at 15:24

## 2021-03-15 RX ADMIN — Medication 10 MG: at 08:38

## 2021-03-15 RX ADMIN — BUPIVACAINE HYDROCHLORIDE IN DEXTROSE 1.8 ML: 7.5 INJECTION, SOLUTION SUBARACHNOID at 07:39

## 2021-03-15 RX ADMIN — ASPIRIN 81 MG: 81 TABLET, COATED ORAL at 11:17

## 2021-03-15 RX ADMIN — DOCUSATE SODIUM 50 MG AND SENNOSIDES 8.6 MG 1 TABLET: 8.6; 5 TABLET, FILM COATED ORAL at 11:17

## 2021-03-15 RX ADMIN — DEXAMETHASONE SODIUM PHOSPHATE 6 MG: 4 INJECTION, SOLUTION INTRAMUSCULAR; INTRAVENOUS at 07:57

## 2021-03-15 RX ADMIN — ASPIRIN 81 MG: 81 TABLET, COATED ORAL at 21:05

## 2021-03-15 RX ADMIN — OXYCODONE HYDROCHLORIDE 5 MG: 5 TABLET ORAL at 18:01

## 2021-03-15 RX ADMIN — PHENYLEPHRINE HYDROCHLORIDE 50 MCG: 10 INJECTION INTRAVENOUS at 08:57

## 2021-03-15 RX ADMIN — POLYETHYLENE GLYCOL 3350 17 G: 17 POWDER, FOR SOLUTION ORAL at 11:17

## 2021-03-15 RX ADMIN — OXYCODONE HYDROCHLORIDE 5 MG: 5 TABLET ORAL at 13:06

## 2021-03-15 RX ADMIN — SODIUM CHLORIDE, POTASSIUM CHLORIDE, SODIUM LACTATE AND CALCIUM CHLORIDE: 600; 310; 30; 20 INJECTION, SOLUTION INTRAVENOUS at 19:21

## 2021-03-15 RX ADMIN — CELECOXIB 200 MG: 200 CAPSULE ORAL at 06:29

## 2021-03-15 RX ADMIN — PROPOFOL INJECTABLE EMULSION 30 MG: 10 INJECTION, EMULSION INTRAVENOUS at 07:32

## 2021-03-15 RX ADMIN — PHENYLEPHRINE HYDROCHLORIDE 50 MCG: 10 INJECTION INTRAVENOUS at 08:43

## 2021-03-15 RX ADMIN — MIDAZOLAM HYDROCHLORIDE 1 MG: 1 INJECTION, SOLUTION INTRAMUSCULAR; INTRAVENOUS at 07:27

## 2021-03-15 RX ADMIN — PROPOFOL INJECTABLE EMULSION 50 MCG/KG/MIN: 10 INJECTION, EMULSION INTRAVENOUS at 07:30

## 2021-03-15 RX ADMIN — CEFAZOLIN SODIUM 2 G: 2 SOLUTION INTRAVENOUS at 15:25

## 2021-03-15 RX ADMIN — DOCUSATE SODIUM 50 MG AND SENNOSIDES 8.6 MG 1 TABLET: 8.6; 5 TABLET, FILM COATED ORAL at 21:05

## 2021-03-15 RX ADMIN — KETOROLAC TROMETHAMINE 7.5 MG: 30 INJECTION, SOLUTION INTRAMUSCULAR at 08:55

## 2021-03-15 RX ADMIN — ACETAMINOPHEN 975 MG: 325 TABLET, FILM COATED ORAL at 21:04

## 2021-03-15 RX ADMIN — METOCLOPRAMIDE 10 MG: 5 INJECTION, SOLUTION INTRAMUSCULAR; INTRAVENOUS at 07:33

## 2021-03-15 RX ADMIN — TRANEXAMIC ACID 1800 MG: 100 INJECTION, SOLUTION INTRAVENOUS at 07:35

## 2021-03-15 RX ADMIN — NIFEDIPINE 30 MG: 30 TABLET, FILM COATED, EXTENDED RELEASE ORAL at 15:25

## 2021-03-15 RX ADMIN — CEFAZOLIN 2 G: 330 INJECTION, POWDER, FOR SOLUTION INTRAMUSCULAR; INTRAVENOUS at 07:31

## 2021-03-15 RX ADMIN — ACETAMINOPHEN 975 MG: 325 TABLET, FILM COATED ORAL at 06:29

## 2021-03-15 RX ADMIN — PHENYLEPHRINE HYDROCHLORIDE 50 MCG: 10 INJECTION INTRAVENOUS at 08:11

## 2021-03-15 RX ADMIN — LIDOCAINE HYDROCHLORIDE 5 ML: 10 INJECTION, SOLUTION INFILTRATION; PERINEURAL at 07:27

## 2021-03-15 RX ADMIN — KETOROLAC TROMETHAMINE 7.5 MG: 15 INJECTION, SOLUTION INTRAMUSCULAR; INTRAVENOUS at 21:11

## 2021-03-15 RX ADMIN — PHENYLEPHRINE HYDROCHLORIDE 50 MCG: 10 INJECTION INTRAVENOUS at 09:05

## 2021-03-15 RX ADMIN — HYDROCHLOROTHIAZIDE 12.5 MG: 12.5 TABLET ORAL at 11:57

## 2021-03-15 RX ADMIN — PHENYLEPHRINE HYDROCHLORIDE 50 MCG: 10 INJECTION INTRAVENOUS at 08:22

## 2021-03-15 RX ADMIN — VANCOMYCIN HYDROCHLORIDE 1.25 G: 10 INJECTION, POWDER, LYOPHILIZED, FOR SOLUTION INTRAVENOUS at 06:29

## 2021-03-15 RX ADMIN — ONDANSETRON HYDROCHLORIDE 4 MG: 2 SOLUTION INTRAMUSCULAR; INTRAVENOUS at 08:55

## 2021-03-15 RX ADMIN — PHENYLEPHRINE HYDROCHLORIDE 50 MCG: 10 INJECTION INTRAVENOUS at 08:31

## 2021-03-15 RX ADMIN — GLYCOPYRROLATE 0.2 MG: 0.2 INJECTION, SOLUTION INTRAMUSCULAR; INTRAVITREAL at 07:42

## 2021-03-15 RX ADMIN — TRAMADOL HYDROCHLORIDE 50 MG: 50 TABLET, FILM COATED ORAL at 11:57

## 2021-03-15 RX ADMIN — MULTIPLE VITAMINS W/ MINERALS TAB 1 TABLET: TAB at 11:17

## 2021-03-15 RX ADMIN — SODIUM CHLORIDE: 9 INJECTION, SOLUTION INTRAVENOUS at 07:27

## 2021-03-15 RX ADMIN — SODIUM CHLORIDE, POTASSIUM CHLORIDE, SODIUM LACTATE AND CALCIUM CHLORIDE: 600; 310; 30; 20 INJECTION, SOLUTION INTRAVENOUS at 10:00

## 2021-03-15 ASSESSMENT — COGNITIVE AND FUNCTIONAL STATUS - GENERAL
WALKING IN HOSPITAL ROOM: 3 - A LITTLE
MOVING TO AND FROM BED TO CHAIR: 3 - A LITTLE
STANDING UP FROM CHAIR USING ARMS: 3 - A LITTLE
CLIMB 3 TO 5 STEPS WITH RAILING: 3 - A LITTLE

## 2021-03-15 NOTE — ASSESSMENT & PLAN NOTE
S/p L knee replacement  -surgical site management, DVT ppx, pain management, and bowel regimen per primary team

## 2021-03-15 NOTE — PROGRESS NOTES
Hospital Medicine Service -  Daily Progress Note       SUBJECTIVE   Interval History:   This is a 75 y.o. female with hx L breast CA (stage 1, s/p lumpectomy, radiation, currently on outpatient anastrozole), HTN on HCTZ, pre-diabetes, obesity, osteoarthritis who presented today for elective L knee replacement. She had this surgery today which went as planned. Her anastrazole was held prior to procedure. She received cefazolin and vancomycin pre-operatively. I saw her on the general medical floors after her surgery. She c/o slight pain in the L knee at the surgical site but states her pain is well controlled. She has no other complaints at that time. Knee has surgical dressings in place with ACE bandage wrapping. Her Post-op BP is elevated to 170s systolic. She states that her BP is normally well controlled at home.     OBJECTIVE      Vital signs in last 24 hours:  Temp:  [35.6 °C (96 °F)-36.6 °C (97.9 °F)] 36 °C (96.8 °F)  Heart Rate:  [63-81] 77  Resp:  [15-18] 16  BP: (108-184)/(51-87) 181/83    Intake/Output Summary (Last 24 hours) at 3/15/2021 1412  Last data filed at 3/15/2021 1000  Gross per 24 hour   Intake 1040 ml   Output 50 ml   Net 990 ml       PHYSICAL EXAMINATION      Physical Exam  Vitals signs and nursing note reviewed.   Constitutional:       General: She is not in acute distress.     Appearance: She is well-developed.   HENT:      Head: Normocephalic and atraumatic.   Eyes:      Pupils: Pupils are equal, round, and reactive to light.   Neck:      Musculoskeletal: Neck supple.      Vascular: No JVD.   Cardiovascular:      Rate and Rhythm: Normal rate and regular rhythm.      Heart sounds: No murmur.   Pulmonary:      Effort: Pulmonary effort is normal.      Breath sounds: Normal breath sounds. No wheezing or rales.   Abdominal:      General: Bowel sounds are normal.      Palpations: Abdomen is soft.   Musculoskeletal:      Comments: L knee in surgical dressings with ACE bandage wrapped around L  leg   Skin:     General: Skin is warm and dry.      Findings: No erythema.   Neurological:      Mental Status: She is alert and oriented to person, place, and time.          LINES, CATHETERS, DRAINS, AIRWAYS, AND WOUNDS   Lines, Drains, and Airways:  Wounds (agree with documentation and present on admission):  Peripheral IV 03/15/21 Posterior;Right Hand (Active)   Number of days: 0       Surgical Incision Knee Left (Active)   Number of days: 0         Comments:      LABS / IMAGING / TELE      Labs  I have reviewed the patient's labs to the time of note. No new clinical concern.    SARS-CoV-2 (COVID-19) (no units)   Date/Time Value   03/10/2021 0954 Negative       Imaging  I have independently reviewed the pertinent imaging from the last 24 hrs.    ECG/Telemetry  I have independently reviewed the telemetry. No events for the last 24 hours.    ASSESSMENT AND PLAN      Hypertension  Assessment & Plan  Pt states that her BP is well controlled at home, however here has been 170s-180s.   Prioritize pain control, although pt does not endorse much pain  C/w home HCTZ. Start nifedipine 30 mg daily and uptitrate as needed - continue to re-evaluate need for this as her BP may be acutely elevated in post-op period and her requirements may change given time from the surgery.     DJD (degenerative joint disease) of knee  Assessment & Plan  S/p L knee replacement  -surgical site management, DVT ppx, pain management, and bowel regimen per primary team    Prediabetes  Assessment & Plan  C/w lifestyle modification  Last A1c 6.0    At risk for obstructive sleep apnea  Assessment & Plan  JEREMY protocol for elevated STOP BANG  Needs sleep study as an outpatient        VTE Assessment: Padua    VTE Prophylaxis:    Code Status: Full Code      Estimated Discharge Date: 3/16/2021   Disposition Planning: Per primary team     Keri Montelongo MD  3/15/2021

## 2021-03-15 NOTE — POST-OP (NON-BILLABLE)
S: Patient seen and examined at bedside.  Patient reports she has been up out of bed with physical therapy and ambulated down the christie.  She states that her pain is well controlled.  She denies any numbness or tingling in her left lower extremity.  All questions answered.    O:    No gross deformity  Ace overlying knee, clean dry and intact  Sensation intact light touch sural/saphenous/SP/DP/tibial  Gross motor intact EHL/FHL/tib ant/gastroc/peroneal's  Palpable DP, 2+    A/P: 75-year-old female status post left total knee arthroplasty with Dr. Burks on 3/15.  Postoperative x-ray left knee demonstrating total knee arthroplasty in good anatomic alignment.  -PT OT  -Pain control  -Postoperative antibiotics x24 hours  -Weightbearing as tolerated left lower extremity  -DVT prophylaxis-ASA 81 twice daily  -Out of bed as able

## 2021-03-15 NOTE — ANESTHESIA PROCEDURE NOTES
Spinal Block    Patient location during procedure: OR  Start time: 3/15/2021 7:20 AM  End time: 3/15/2021 7:25 AM  Staffing  Anesthesiologist: Bernadette Chappell MD  Reason for block: at surgeon's request  Preanesthetic Checklist  Completed: patient identified, surgical consent, pre-op evaluation, timeout performed, IV checked, risks and benefits discussed, monitors and equipment checked and sterile field maintained during procedure  Spinal Block  Patient position: sitting  Prep: ChloraPrep and site prepped and draped  Patient monitoring: heart rate, cardiac monitor, continuous pulse ox and blood pressure  Approach: midline  Location: L3-4  Injection technique: single-shot  Needle  Needle type: Sprotte   Needle gauge: 24 G  Needle length: 3.5 in  Assessment  Sensory level: T4  Events: cerebrospinal fluid  Additional Notes  Procedure well tolerated. Vital signs stable.    Medications Administered -   Bupivacaine PF (MARCAINE SPINAL) 0.75% intrathecal injection, 1.8 mL

## 2021-03-15 NOTE — PLAN OF CARE
Problem: Adult Inpatient Plan of Care  Goal: Plan of Care Review  Flowsheets (Taken 3/15/2021 1524)  Progress: improving  Plan of Care Reviewed With:   patient   sibling  Outcome Summary: Anticipate d/c to her sister's home, likely with [facility] safety eval, once stable. Might need BLS if unable to do stairs.     Problem: Adult Inpatient Plan of Care  Goal: Readiness for Transition of Care  Intervention: Mutually Develop Transition Plan  Flowsheets  Taken 3/15/2021 1524 by Felicitas Estrella RN  Anticipated Discharge Disposition:   home with outpatient services   family member's home  Discharge Coordination/Progress: Patient is s/p TKR. Met with patient and Facetimed with her sisters Sari [phone] and Yaritza [phone] to discuss potential d/c needs. The plan is for the patient to stay with Sari at [address] [place] [address],  [address], [place] [phone] who is there most of the time and works from home. She denies hx of HHC and SNF, has a SPC. Sari can transport. She is concerned because there are 10-12 NASIR the single-floor apartment. Discussed that if patient is unable to do stairs,  can arrange BLS. Patient's sisters would like her to get home PT. D/w Alex Zepeda navigator, she will check with MD Burks and see if he is in agreement with [facility] safety eval/if [facility] takes her insurance and can service where she lives. Verified pharmacy and PCP.  Current Discharge Risk:   lives alone   cognitively impaired  Readmission Within the Last 30 Days: no previous admission in last 30 days  Patient/Family Anticipated Services at Transition: home health care  Patient/Family Anticipates Transition to: family members' home  Transportation Anticipated: family or friend will provide  Concerns to be Addressed: denies needs/concerns at this time  Taken 3/15/2021 1420 by Rajan Mon PT  Assistive Device/Animal Currently Used at Home: cane, straight

## 2021-03-15 NOTE — OP NOTE
Left TKA Post-Operative Note     Date:  3/15/2021    Location:  [facility] OR    Pre-operative diagnosis:  Left Knee Arthritis    Post-operative diagnosis:  same    Procedure:  Left Total Knee Arthroplasty    Surgeon(s) and Role:     * Eric Burks,  - Primary     * Jean De León PA C - Assisting     * Willy Cole DO - Resident - Assisting    Anesthesiologist:  Bernadette Chappell MD    Anesthesia:  Spinal    EBL: No blood loss documented.    Tourniquet time:  * Missing tourniquet times found for documented tourniquets in lo *  Total Tourniquet Time Documented:  Thigh (Left) - 76 minutes  Total: Thigh (Left) - 76 minutes      Implants:    Implant Name Type Inv. Item Serial No.  Lot No. LRB No. Used Action   CEMENT BONE SIMPLEX FULL DOSE - [id number]  CEMENT BONE SIMPLEX FULL DOSE  [employer] [id number] Left 2 Implanted   INSERT TRIATHALON TIBIAL BEEARING ETO - [id number]  INSERT TRIATHALON TIBIAL BEEARING ETO  [employer] [id number] Left 1 Implanted   BASEPLATE TRIATHLON PRIM TIB CEMENTED SZ 4 - [id number]  BASEPLATE TRIATHLON PRIM TIB CEMENTED SZ 4  TO[employer] [id number] Left 1 Implanted   COMPONENT TRIATHLON CR FEM CEMENTED - [id number] Femoral knee component COMPONENT TRIATHLON CR FEM CEMENTED  [employer] [id number] Left 1 Implanted   PATELLA TRIATHLON ASYMMETRIC X3 ETO - [id number]  PATELLA TRIATHLON ASYMMETRIC X3 ETO  TOBY ORTH[employer] [id number] Left 1 Implanted       Indications:      Maria Elena Lowry is a 75 y.o. year old female with a history of left knee arthritis.  The patient failed conservative care measures and after understanding risks, benefits and associated treatment options they elected to proceed with total knee arthroplasty.  All complications were discussed pre-operatively including but not limited to pain, infection, scar, stiffness, bleeding, blood loss, neurovascular injury, implant failure, fracture, DVT, PE, MI, CVA, loss of limb, loss of life, need for  additional surgery.    Gross Findings:      At the time of surgery the patient was found to have synovitis, osteophytes and loss of cartilage.    Procedure:    After informed consent was obtained the patient was identified in the operating room.  After the induction of Spinal anesthesia  a well-padded tourniquet was placed on the operative leg.  The leg was then prepped and draped in the usual sterile fashion.  IV antibiotics and TXA were given.  A time-out was performed.  The tourniquet was inflated.    An anterior midline incision was made, followed by a medial para-patellar arthrotomy.  Soft-tissue releases were then performed as necessary.    The knee was then flexed and an intramedullary hole was made in the femur which was irrigated to decrease the fat emboli load.  The distal femur was then cut at 5 degrees of valgus and a 8 millimeter resection was utilized.    The posterior condyles were then assessed and checked with the epi-condylar axis and Whitesides line and sizing and rotation were set.  The appropriate 5 cutting block was utilized and all anterior, posterior and chamfer cuts were performed.    The tibia was then subluxed forward and meniscectomies were performed.  An extramedullary guide was utilized to make a measure resection of 2 millimeters off the medial side.  After the saw cut was performed it was checked with the spacer block and drop ronnie to confirm alignment.     Next, the posterior and intercondylar notch osteophytes were removed as necessary.  The knee was then copiously irrigated with saline.    The tibia was subluxed forward and sized to a 4, rotation was set and the keel punch was utilized.  Trialing was performed and the knee was found to be balanced with the 9 millimeter CS insert.      The leg was placed into extension and the patella was identified.  The patella was then measured and cut to restore composite thickness to the pre-operative thickness.  The patella was sized to a 32  and the drill guide was utilized.  A patellar button was placed.    There was full extension, full flexion and patellar tracking was found to be appropriate.    All trial implants were removed, sclerotic bone was drilled with a small drill bit, and the wound was washed with additional saline with pulse lavage.  Cement was mixed on the back table with a vacuum centrifuge.  Cement was applied to all bony surfaces, all implants were impacted, and all excess cement was removed.  The knee was held in position while the cement fully cured.    A last search for cement was performed.  The knee was once again taken through a range of motion.  Limb alignment, soft tissue balancing and patellar tracking were all found to be appropriate.  The knee was once again irrigated.    The arthrotomy was closed with interrupted #1 ethibond suture.  The knee was injected with a mixture of 60cc 0.25% bupivacaine with epinephrine. The subcutaneous tissue was closed with #0 vicryl followed by 3-0 vicyl for the deep dermal sutures.  The skin was closed with 4-0 barbed monofilament suture and dermabond.  The tourniquet was let down. The dressings were applied and the patient was awakened from anesthesia and transported to the recovery room in stable condition.    Eric Burks,   3/15/2021

## 2021-03-15 NOTE — HOSPITAL COURSE
Maria Elena is a 75 y.o. female admitted on 3/15/2021 with DJD (degenerative joint disease) of knee. Principal problem is No Principal Problem: There is no principal problem currently on the Problem List. Please update the Problem List and refresh..    Past Medical History  Maria Elena has a past medical history of Arthritis, Breast cancer (CMS/[facility]) (2017), Estrogen receptor positive status (ER+), Hearing deficit, Hypertension, and Obesity.    History of Present Illness   L TKA

## 2021-03-15 NOTE — PROGRESS NOTES
Patient: Maria Elena Lowry  Location: [place] [address]  MRN: [id number]  Today's date: 3/15/2021    Maria Elena is a 75 y.o. female admitted on 3/15/2021 with DJD (degenerative joint disease) of knee. Principal problem is No Principal Problem: There is no principal problem currently on the Problem List. Please update the Problem List and refresh..    Past Medical History  Maria Elena has a past medical history of Arthritis, Breast cancer (CMS/[facility]) (2017), Estrogen receptor positive status (ER+), Hearing deficit, Hypertension, and Obesity.    History of Present Illness   L TKA  Concluded session with pt supine in bed with RN remote in hand.   Bed alarm set    PT Vitals    Date/Time Pulse HR Source Resp SpO2 Pt Activity O2 Therapy BP BP Location BP Method Pt Position [facility]   03/15/21 1400 64 Monitor 16 98 % At rest None (Room air) 177/72 Right upper arm Automatic Sitting [place]   03/15/21 1405 64 -- -- 98 % -- -- 177/72 -- -- -- JUDE      PT Pain    Date/Time Pain Type Pref Pain Scale Location Rating: Rest Rating: Activity [facility]   03/15/21 1351 Pain Assessment number (Numeric Rating Pain Scale) knee 7 7 LK   03/15/21 1405 Pain Assessment number (Numeric Rating Pain Scale) knee 0 0 JUDE          Prior Living Environment      Most Recent Value   Living Arrangements  house   Living Environment Comment  going to stay with family   Number of Stairs, Main Entrance  10   Stair Railings, Main Entrance  railings safe and in good condition   Number of Stairs, Within Home, Primary  0          Prior Level of Function      Most Recent Value   Dominant Hand  right   Ambulation  assistive equipment   Transferring  assistive equipment   Toileting  independent   Bathing  independent   Dressing  independent   Eating  independent   Communication  understands/communicates without difficulty   Swallowing  swallows foods/liquids without difficulty   Assistive Device/Animal Currently Used at Home  cane, straight          PT Evaluation  and Treatment - 03/15/21 1420        Time Calculation    Start Time  1350     Stop Time  1410     Time Calculation (min)  20 min        Session Details    Document Type  initial evaluation     Mode of Treatment  individual therapy;physical therapy        General Information    Patient Profile Reviewed?  yes     Onset of Illness/Injury or Date of Surgery  03/15/21     Referring Physician  sizer     Existing Precautions/Restrictions  weight bearing     Limitations/Impairments  hearing        Weight-Bearing Status    Left LE Weight-Bearing Status  weight-bearing as tolerated (WBAT)        Cognition/Psychosocial    Orientation Status (Cognition)  oriented x 3        Sensory Assessment (Somatosensory)    Sensory Assessment (Somatosensory)  sensation intact        Range of Motion (ROM)    Range of Motion  ROM is WFL     Knee, Left (ROM)  5-86        Strength (Manual Muscle Testing)    Strength (Manual Muscle Testing)  strength is WFL        Strength Comprehensive (MMT)    Comprehensive MMT Assessment  no strength deficits identified        Bed Mobility    [place], Supine to Sit  supervision     [place], Sit to Supine  supervision        Bed to Chair Transfer    [place], Bed to Chair  not tested        Chair to Bed Transfer    [place], Chair to Bed  not tested        Sit to Stand Transfer    [place], Sit to Stand Transfer  supervision        Stand to Sit Transfer    [place], Stand to Sit Transfer  supervision        Gait Training    [place], Gait  supervision     Assistive Device  walker, front-wheeled     Distance in Feet  125 feet     Gait Pattern Utilized  step-to     Deviations/Abnormal Patterns (Gait)  mani decreased;gait speed decreased     Maintains Weight-Bearing Status  able to maintain        Stairs Training    [place], Stairs  not tested        AM-PAC (TM) - Mobility (Current Function)    Turning from your back to your side while in a flat bed without using bedrails?  3  - A Little     Moving from lying on your back to sitting on the side of a flat bed without using bedrails?  3 - A Little     Moving to and from a bed to a chair?  3 - A Little     Standing up from a chair using your arms?  3 - A Little     To walk in a hospital room?  3 - A Little     Climbing 3-5 steps with a railing?  3 - A Little     AM-PAC (TM) Mobility Score  18        Therapy Assessment/Plan (PT)    Rehab Potential (PT)  good, to achieve stated therapy goals     Therapy Frequency (PT)  daily     Problem List  cognition;strength;pain     Patient/Family Therapy Goals Statement (PT)  none voiced        Progress Summary (PT)    Daily Outcome Statement (PT)  Pt doing well from a mobility standpoint and appears on track to return home. Spoke with family during session and was notified pt has a learning level of 3 to 4th grade and is [place]. Rec to have pt repeat back instructions for comprehension per family. May need RW and Commode for home> family is looking to see if they have DME already.         Therapy Plan Review/Discharge Plan (PT)    PT Recommended Discharge Disposition  home with home health     Anticipated Equipment Needs at Discharge (PT Eval)  commode, 3-in-1;walker, front-wheeled     Therapy Plan Review (PT)  evaluation/treatment results reviewed                       Education provided this session. See the Patient Education summary report for full details.    PT Goals      Most Recent Value   Bed Mobility Goal 1   Activity/Assistive Device  bed mobility activities, all at 03/15/2021 1420   [place]  modified independence at 03/15/2021 1420   Time Frame  2 days at 03/15/2021 1420   Progress/Outcome  goal ongoing at 03/15/2021 1420   Transfer Goal 1   Activity/Assistive Device  all transfers at 03/15/2021 1420   [place]  modified independence at 03/15/2021 1420   Time Frame  2 days at 03/15/2021 1420   Progress/Outcome  goal ongoing at 03/15/2021 1420   Gait Training Goal 1   Activity/Assistive  Device  gait (walking locomotion) at 03/15/2021 1420   [place]  modified independence at 03/15/2021 1420   Distance  250 at 03/15/2021 1420   Time Frame  2 days at 03/15/2021 1420   Progress/Outcome  goal ongoing at 03/15/2021 1420   Stairs Goal 1   Activity/Assistive Device  stairs, all skills at 03/15/2021 1420   [place]  modified independence at 03/15/2021 1420   Number of Stairs  10 at 03/15/2021 1420   Time Frame  2 days at 03/15/2021 1420   Progress/Outcome  goal ongoing at 03/15/2021 1420

## 2021-03-15 NOTE — PLAN OF CARE
Problem: Adult Inpatient Plan of Care  Goal: Plan of Care Review  Outcome: Progressing  Flowsheets (Taken 3/15/2021 7170)  Progress: improving  Plan of Care Reviewed With: patient  Outcome Summary: mobility limited by decreased strength.

## 2021-03-15 NOTE — ANESTHESIOLOGIST PRE-PROCEDURE ATTESTATION
Pre-Procedure Patient Identification:  I am the Primary Anesthesiologist and have identified the patient on 03/15/21 at 6:55 AM.   I have confirmed the following procedure(s) Left Total Knee Replacement (L) will be performed by the following surgeon/proceduralist Fanyr, Eric MANCERA DO.

## 2021-03-15 NOTE — ANESTHESIA POSTPROCEDURE EVALUATION
Patient: Maria Elena Lowry    Procedure Summary     Date: 03/15/21 Room / [facility]: [facility] / [facility]    Anesthesia Start: 0728 Anesthesia Stop: 0919    Procedure: Left Total Knee Replacement (Left Knee) Diagnosis:       Osteoarthritis of left knee, unspecified osteoarthritis type      (M17.12 Osteoarthritis)    Surgeon: Eric Burks DO Responsible Provider: Bernadette Chappell MD    Anesthesia Type: spinal ASA Status: 2          Anesthesia Type: spinal  PACU Vitals  3/15/2021 0912 - 3/15/2021 0935      3/15/2021  0915             BP:  (!) 108/51    Temp:  36.4 °C (97.6 °F)    Pulse:  71    Resp:  15    SpO2:  100 %            Anesthesia Post Evaluation    Pain management: adequate  Patient participation: complete - patient participated  Level of consciousness: awake and alert  Cardiovascular status: acceptable  Airway Patency: adequate  Respiratory status: acceptable  Hydration status: acceptable  Anesthetic complications: no

## 2021-03-15 NOTE — PATIENT CARE CONFERENCE
Care Progression Rounds Note  Date: 3/15/2021  Time: 12:11 PM     Patient Name: Maria Elena Lowry     Medical Record Number: [id number]   YOB: 1945  Sex: Female      Room/Bed: 0184    Admitting Diagnosis: Osteoarthritis of left knee, unspecified osteoarthritis type [M17.12]  OA (osteoarthritis) of knee [M17.10]  DJD (degenerative joint disease) of knee [M17.10]   Admit Date/Time: 3/15/2021  5:48 AM    Primary Diagnosis: No Principal Problem: There is no principal problem currently on the Problem List. Please update the Problem List and refresh.  Principal Problem: No Principal Problem: There is no principal problem currently on the Problem List. Please update the Problem List and refresh.    GMLOS: pending  Anticipated Discharge Date: 3/16/2021    AM-PAC  Mobility Score:      Discharge Planning:       Barriers to Discharge:  Barriers to Discharge: Medical issues not resolved, Test pending, Therapy update pending, Consultant recommendations pending    Participants:  advanced practice provider, , nursing, physical therapy, physician, social work/services

## 2021-03-15 NOTE — NURSING NOTE
Plan of care reviewed including goals of care in hospital and following discharge. Discussed dispo planning with sister Sari who will be taking patient home after surgery. Sari mentioned patient is very [place] and functions at level of 15y/o so combined this causes her to be special needs. She said she is high functioning as long as she is spoken directly to and made to repeat back she will comprehend and remember information. Staff notified. Will include Sari in my teaching with patient via phone.

## 2021-03-15 NOTE — ASSESSMENT & PLAN NOTE
Pt states that her BP is well controlled at home, however here has been 170s-180s.   C/w home HCTZ. Nifedipine 30 mg daily started with good response. Would continue at home with close PCP follow up within 1 week  C/w pain control

## 2021-03-15 NOTE — OR SURGEON
Pre-Procedure patient identification:  I am the primary operating surgeon/proceduralist and I have identified the patient and confirmed laterality is left knee on 03/15/21 at 7:04 AM Eric Burks DO  Phone Number: [phone]

## 2021-03-15 NOTE — PROGRESS NOTES
I spoke with both the patient's sister, Yaritza ([phone]) and her sister-in-law, Sari ([phone]). Sari is a  nurse in [place]. The patient will stay with the sister-in-law, Sari postop.  The ogther  sister, Yaritza, raised several questions.  1.  She is concerned about the patient having to navigate 12 stairs to the sister-in-law's apartment.  I told her we will work with her in PT to make sure she could do stairs.  She will have to go very slowly and take breaks and will need help.  2.  The sister is a nurse and wanted to stay with the patient because the patient is cognitively challenged.  I informed her there can only be one person and we only give one pass so whoever comes has to stay for the whole admission.  I recommended that she come on the day of discharge, probably tomorrow.  That way, she can be here for PT/OT and for education.  3.  Home care.  I informed her that Dr. Burks program is to not have home care or home PT/OT.  We just want her to walk as tolerated.  PT will show her. OT will show her how to do ADLs. Then she will see Dr. Burks in the office in 2 weeks.  Yaritza seems happy with that plan.  She will come in tomorrow if the patient is going home tomorrow.  Yaritza and Sari will both be there to help the patient get up the stairs to the apartment.

## 2021-03-16 VITALS
RESPIRATION RATE: 18 BRPM | TEMPERATURE: 98.5 F | OXYGEN SATURATION: 97 % | DIASTOLIC BLOOD PRESSURE: 60 MMHG | WEIGHT: 194 LBS | BODY MASS INDEX: 35.7 KG/M2 | HEIGHT: 62 IN | SYSTOLIC BLOOD PRESSURE: 142 MMHG | HEART RATE: 68 BPM

## 2021-03-16 LAB
ANION GAP SERPL CALC-SCNC: 8 MEQ/L (ref 3–15)
BUN SERPL-MCNC: 21 MG/DL (ref 8–20)
CALCIUM SERPL-MCNC: 8.9 MG/DL (ref 8.9–10.3)
CHLORIDE SERPL-SCNC: 102 MEQ/L (ref 98–109)
CO2 SERPL-SCNC: 26 MEQ/L (ref 22–32)
CREAT SERPL-MCNC: 0.6 MG/DL (ref 0.6–1.1)
GFR SERPL CREATININE-BSD FRML MDRD: >60 ML/MIN/1.73M*2
GLUCOSE SERPL-MCNC: 178 MG/DL (ref 70–99)
POTASSIUM SERPL-SCNC: 4.4 MEQ/L (ref 3.6–5.1)
SARS-COV-2 RNA RESP QL NAA+PROBE: NEGATIVE
SODIUM SERPL-SCNC: 136 MEQ/L (ref 136–144)

## 2021-03-16 PROCEDURE — 80048 BASIC METABOLIC PNL TOTAL CA: CPT | Performed by: PHYSICIAN ASSISTANT

## 2021-03-16 PROCEDURE — 36415 COLL VENOUS BLD VENIPUNCTURE: CPT | Performed by: PHYSICIAN ASSISTANT

## 2021-03-16 PROCEDURE — 63600000 HC DRUGS/DETAIL CODE: Performed by: PHYSICIAN ASSISTANT

## 2021-03-16 PROCEDURE — 63700000 HC SELF-ADMINISTRABLE DRUG: Performed by: STUDENT IN AN ORGANIZED HEALTH CARE EDUCATION/TRAINING PROGRAM

## 2021-03-16 PROCEDURE — 99232 SBSQ HOSP IP/OBS MODERATE 35: CPT | Performed by: STUDENT IN AN ORGANIZED HEALTH CARE EDUCATION/TRAINING PROGRAM

## 2021-03-16 PROCEDURE — 63700000 HC SELF-ADMINISTRABLE DRUG: Performed by: PHYSICIAN ASSISTANT

## 2021-03-16 PROCEDURE — 97166 OT EVAL MOD COMPLEX 45 MIN: CPT | Mod: GO

## 2021-03-16 PROCEDURE — 97530 THERAPEUTIC ACTIVITIES: CPT | Mod: GP,CQ

## 2021-03-16 PROCEDURE — 97116 GAIT TRAINING THERAPY: CPT | Mod: GP,CQ

## 2021-03-16 PROCEDURE — U0002 COVID-19 LAB TEST NON-CDC: HCPCS | Performed by: ORTHOPAEDIC SURGERY

## 2021-03-16 RX ORDER — CICLOPIROX 80 MG/ML
SOLUTION TOPICAL
Start: 2021-03-16

## 2021-03-16 RX ORDER — TRIAMCINOLONE ACETONIDE 1 MG/G
1 OINTMENT TOPICAL AS NEEDED
Qty: 30 G | Refills: 1
Start: 2021-03-16 | End: 2021-04-15

## 2021-03-16 RX ORDER — OXYCODONE HYDROCHLORIDE 5 MG/1
5 TABLET ORAL EVERY 4 HOURS PRN
Qty: 20 TABLET | Refills: 0 | Status: SHIPPED | OUTPATIENT
Start: 2021-03-16 | End: 2021-03-21

## 2021-03-16 RX ORDER — ASPIRIN 81 MG/1
81 TABLET ORAL 2 TIMES DAILY
Qty: 60 TABLET | Refills: 0
Start: 2021-03-16 | End: 2021-04-15

## 2021-03-16 RX ORDER — CELECOXIB 200 MG/1
200 CAPSULE ORAL DAILY
Qty: 30 CAPSULE | Refills: 0 | Status: SHIPPED | OUTPATIENT
Start: 2021-03-16 | End: 2021-04-15

## 2021-03-16 RX ORDER — TRAMADOL HYDROCHLORIDE 50 MG/1
50-100 TABLET ORAL EVERY 6 HOURS PRN
Qty: 30 TABLET | Refills: 0 | Status: SHIPPED | OUTPATIENT
Start: 2021-03-16 | End: 2021-03-21

## 2021-03-16 RX ORDER — AMOXICILLIN 250 MG
1 CAPSULE ORAL 2 TIMES DAILY PRN
Start: 2021-03-16 | End: 2021-04-15

## 2021-03-16 RX ORDER — GUAIFENESIN 1200 MG
975 TABLET, EXTENDED RELEASE 12 HR ORAL EVERY 8 HOURS
Start: 2021-03-16 | End: 2021-03-30

## 2021-03-16 RX ORDER — ANASTROZOLE 1 MG/1
1 TABLET ORAL DAILY
Qty: 30 TABLET | Refills: 0
Start: 2021-03-23 | End: 2021-04-22

## 2021-03-16 RX ORDER — NIFEDIPINE 30 MG/1
30 TABLET, EXTENDED RELEASE ORAL DAILY
Qty: 30 TABLET | Refills: 0 | Status: SHIPPED | OUTPATIENT
Start: 2021-03-16 | End: 2021-04-15

## 2021-03-16 RX ORDER — POLYETHYLENE GLYCOL 3350 17 G/17G
17 POWDER, FOR SOLUTION ORAL DAILY PRN
Start: 2021-03-16 | End: 2021-04-15

## 2021-03-16 RX ADMIN — ACETAMINOPHEN 975 MG: 325 TABLET, FILM COATED ORAL at 07:58

## 2021-03-16 RX ADMIN — MULTIPLE VITAMINS W/ MINERALS TAB 1 TABLET: TAB at 08:03

## 2021-03-16 RX ADMIN — ASPIRIN 81 MG: 81 TABLET, COATED ORAL at 08:01

## 2021-03-16 RX ADMIN — KETOROLAC TROMETHAMINE 7.5 MG: 15 INJECTION, SOLUTION INTRAMUSCULAR; INTRAVENOUS at 03:48

## 2021-03-16 RX ADMIN — POLYETHYLENE GLYCOL 3350 17 G: 17 POWDER, FOR SOLUTION ORAL at 08:03

## 2021-03-16 RX ADMIN — OXYCODONE HYDROCHLORIDE 5 MG: 5 TABLET ORAL at 04:47

## 2021-03-16 RX ADMIN — ONDANSETRON 4 MG: 4 TABLET, ORALLY DISINTEGRATING ORAL at 06:04

## 2021-03-16 RX ADMIN — HYDROCHLOROTHIAZIDE 12.5 MG: 12.5 TABLET ORAL at 07:59

## 2021-03-16 RX ADMIN — CELECOXIB 200 MG: 200 CAPSULE ORAL at 08:00

## 2021-03-16 RX ADMIN — NIFEDIPINE 30 MG: 30 TABLET, FILM COATED, EXTENDED RELEASE ORAL at 08:02

## 2021-03-16 RX ADMIN — KETOROLAC TROMETHAMINE 7.5 MG: 15 INJECTION, SOLUTION INTRAMUSCULAR; INTRAVENOUS at 11:33

## 2021-03-16 RX ADMIN — DEXAMETHASONE SODIUM PHOSPHATE 6 MG: 4 INJECTION, SOLUTION INTRA-ARTICULAR; INTRALESIONAL; INTRAMUSCULAR; INTRAVENOUS; SOFT TISSUE at 06:03

## 2021-03-16 RX ADMIN — DOCUSATE SODIUM 50 MG AND SENNOSIDES 8.6 MG 1 TABLET: 8.6; 5 TABLET, FILM COATED ORAL at 08:02

## 2021-03-16 RX ADMIN — SODIUM CHLORIDE, POTASSIUM CHLORIDE, SODIUM LACTATE AND CALCIUM CHLORIDE: 600; 310; 30; 20 INJECTION, SOLUTION INTRAVENOUS at 03:37

## 2021-03-16 ASSESSMENT — COGNITIVE AND FUNCTIONAL STATUS - GENERAL
TOILETING: 3 - A LITTLE
AFFECT: WFL
MOVING TO AND FROM BED TO CHAIR: 3 - A LITTLE
WALKING IN HOSPITAL ROOM: 3 - A LITTLE
HELP NEEDED FOR PERSONAL GROOMING: 4 - NONE
STANDING UP FROM CHAIR USING ARMS: 3 - A LITTLE
HELP NEEDED FOR BATHING: 2 - A LOT
DRESSING REGULAR UPPER BODY CLOTHING: 3 - A LITTLE
DRESSING REGULAR LOWER BODY CLOTHING: 2 - A LOT
EATING MEALS: 4 - NONE
CLIMB 3 TO 5 STEPS WITH RAILING: 3 - A LITTLE

## 2021-03-16 NOTE — PLAN OF CARE
Problem: Adult Inpatient Plan of Care  Goal: Plan of Care Review  Outcome: Progressing  Flowsheets (Taken 3/16/2021 7402)  Progress: improving  Plan of Care Reviewed With: patient  Outcome Summary: PT:  Pt demonstrated safe transfers and mob.  Rec home w/ family A and home care services.

## 2021-03-16 NOTE — PROGRESS NOTES
Hospital Medicine Service -  Daily Progress Note       SUBJECTIVE   Interval History: JONN Has knee pain which is under control. BP better controlled after addition of nifedipine.      OBJECTIVE      Vital signs in last 24 hours:  Temp:  [35.6 °C (96 °F)-36.9 °C (98.5 °F)] 36.9 °C (98.5 °F)  Heart Rate:  [57-77] 69  Resp:  [15-18] 18  BP: (142-184)/(60-87) 142/60    Intake/Output Summary (Last 24 hours) at 3/16/2021 1132  Last data filed at 3/15/2021 1624  Gross per 24 hour   Intake --   Output 500 ml   Net -500 ml       PHYSICAL EXAMINATION      Physical Exam  Vitals signs and nursing note reviewed.   Constitutional:       General: She is not in acute distress.     Appearance: She is well-developed.   HENT:      Head: Normocephalic and atraumatic.   Eyes:      General:         Left eye: No discharge.      Pupils: Pupils are equal, round, and reactive to light.   Neck:      Musculoskeletal: Neck supple.      Vascular: No JVD.   Cardiovascular:      Rate and Rhythm: Normal rate and regular rhythm.      Heart sounds: No murmur.   Pulmonary:      Effort: Pulmonary effort is normal.      Breath sounds: Normal breath sounds. No wheezing or rales.   Abdominal:      General: Bowel sounds are normal.      Palpations: Abdomen is soft.   Musculoskeletal:      Comments: L knee surgical dressings in place   Skin:     General: Skin is warm and dry.      Findings: No erythema.   Neurological:      Mental Status: She is alert and oriented to person, place, and time.            LINES, CATHETERS, DRAINS, AIRWAYS, AND WOUNDS   Lines, Drains, and Airways:  Wounds (agree with documentation and present on admission):  Surgical Incision Knee Left (Active)   Number of days: 1         Comments:      LABS / IMAGING / TELE      Labs  I have reviewed the patient's labs to the time of note. No new clinical concern.    SARS-CoV-2 (COVID-19) (no units)   Date/Time Value   03/16/2021 0832 Negative       Imaging  I have independently reviewed  the pertinent imaging from the last 24 hrs.    ECG/Telemetry  I have independently reviewed the telemetry. No events for the last 24 hours.    ASSESSMENT AND PLAN      DJD (degenerative joint disease) of knee  Assessment & Plan  S/p L knee replacement  -surgical site management, DVT ppx, pain management, and bowel regimen per primary team    Hypertension  Assessment & Plan  Pt states that her BP is well controlled at home, however here has been 170s-180s.   C/w home HCTZ. Nifedipine 30 mg daily started with good response. Would continue at home with close PCP follow up within 1 week  C/w pain control     Prediabetes  Assessment & Plan  C/w lifestyle modification  Last A1c 6.0    At risk for obstructive sleep apnea  Assessment & Plan  JEREMY protocol for elevated STOP BANG  Needs sleep study as an outpatient        VTE Assessment: [place]    VTE Prophylaxis:    Code Status: Full Code      Estimated Discharge Date: 3/16/2021   Disposition Planning: Discharge today per primary team     Keri Montelongo MD  3/16/2021

## 2021-03-16 NOTE — PROGRESS NOTES
Patient: Maria Elena Lowry   MRN: [id number]   : 1945       Daily Progress Note 1 Day Post-Op s/p Procedure(s):  Left Total Knee Replacement     Subjective     75 y.o. y/o female s/p Procedure(s):  Left Total Knee Replacement.     Patient is doing well. Pain is controlled. Denies chest pain, shortness of breath. Denies nausea or vomiting. Admits to flatus but no BM. Voiding without difficulties. Tolerating diet. OOB with PT today. Denies fever or chills.     Patient Active Problem List   Diagnosis   • Malignant neoplasm of left breast in female, estrogen receptor positive (CMS/HCC)   • Preop examination   • Primary localized osteoarthritis of left knee   • Hypertension   • At risk for obstructive sleep apnea   • Prediabetes   • OA (osteoarthritis) of knee   • DJD (degenerative joint disease) of knee       Objective     LAST VITALS:    Vitals:    21 0600 21 0845 21 0945 21 0950   BP: (!) 175/77  (!) 163/87 (!) 142/60   BP Location: Right upper arm  Right upper arm Right upper arm   Patient Position: Lying  Lying Lying   Pulse: (!) 57 72 69    Resp: 15  18    Temp: 36.8 °C (98.2 °F)  36.9 °C (98.5 °F)    TempSrc: Oral  Oral    SpO2: 98% 98% 97%    Weight:       Height:           Temp (24hrs), Av.3 °C (97.3 °F), Min:35.6 °C (96 °F), Max:36.9 °C (98.5 °F)        Intake/Output Summary (Last 24 hours) at 3/16/2021 1004  Last data filed at 3/15/2021 1624  Gross per 24 hour   Intake --   Output 500 ml   Net -500 ml      No intake/output data recorded.    Allergies: No known drug allergies       PHYSICAL EXAM:    Incision: clean, dry and intact with expected postop edema and ecchymosis. + 5/5 DF/PF/EHL bilateral. +NVI with sensation intact. Distal Pulses: 2+ bilateral,Calves: soft, non tender bilateral      LABS:     Results from last 7 days   Lab Units 21  0842 03/10/21  0954   WBC K/uL  --  8.09   HEMOGLOBIN g/dL  --  12.3   HEMATOCRIT %  --  38.0   PLATELETS K/uL  --  394*    SODIUM mEQ/L 136 142   POTASSIUM mEQ/L 4.4 4.5   CHLORIDE mEQ/L 102 105   CO2 mEQ/L 26 27   BUN mg/dL 21* 26*   CREATININE mg/dL 0.6 0.7   GLUCOSE mg/dL 178* 93   CALCIUM mg/dL 8.9 9.4         Assessment/Plan     75 y.o. y/o female s/p Procedure(s):  Left Total Knee Replacement     1 Day Post-Op     1. DVT prophylaxis: Asprin 81 mg po BID for 4 weeks and bilateral SCD  2. PT/OT per protocol  3. Incentive spirometer  4. Pain management: Scheduled: tylenol, celebrex, toradol PRN: oxycodone, tramadol, IV dilaudid   5. Bowel regimen: senokot, colace, miralax  6. Medicine: appreciate recommendations   7. HTN: continue with home HCTZ and per medicine added nifedipine XL. Continue to monitor and pain control.   8. Pre DM: continue to monitor. Regular diet with NCS. Labs obtained after breakfast.     Dispo: home d/c if stable per PT/OT/Cards/Medicine    GLO Hopson  3/16/2021  10:04 AM

## 2021-03-16 NOTE — PROGRESS NOTES
"Patient: Maria Elena Lowry  Location: [place] [address]  MRN: [id number]  Today's date: 3/16/2021     Pt returned to bed w/ call bell in reach.  All needs met at this time.    Maria Elena is a 75 y.o. female admitted on 3/15/2021 with DJD (degenerative joint disease) of knee. Principal problem is No Principal Problem: There is no principal problem currently on the Problem List. Please update the Problem List and refresh..    Past Medical History  Maria Elena has a past medical history of Arthritis, Breast cancer (CMS/[facility]) (2017), Estrogen receptor positive status (ER+), Hearing deficit, Hypertension, and Obesity.    History of Present Illness   L TKA    PT Vitals    Date/Time Pulse HR Source SpO2 Pt Activity O2 Therapy [facility]   03/16/21 0845 72 Monitor 98 % Walking None (Room air) [facility]      PT Pain    Date/Time Pain Type Pref Pain Scale Rating: Rest Rating: Activity [facility]   03/16/21 0845 Pain Assessment number (Numeric Rating Pain Scale) 0 0 [facility]          Prior Living Environment      Most Recent Value   Living Arrangements  house   Living Environment Comment  going to stay with family   Number of Stairs, Main Entrance  10   Stair Railings, Main Entrance  railings safe and in good condition   Number of Stairs, Within Home, Primary  0          Prior Level of Function      Most Recent Value   Dominant Hand  right   Ambulation  assistive equipment   Transferring  assistive equipment   Toileting  independent   Bathing  independent   Dressing  independent   Eating  independent   Communication  understands/communicates without difficulty   Swallowing  swallows foods/liquids without difficulty   Assistive Device/Animal Currently Used at Home  cane, straight, commode, 3-in-1, grab bar, other (see comments) [\"slide\"/handicapped accesible bathroom]          PT Evaluation and Treatment - 03/16/21 0845        Time Calculation    Start Time  0845     Stop Time  0931     Time Calculation (min)  46 min        Session " Details    Document Type  daily treatment/progress note     Mode of Treatment  physical therapy        General Information    Patient Profile Reviewed?  yes     Existing Precautions/Restrictions  weight bearing     Limitations/Impairments  hearing        Weight-Bearing Status    Left LE Weight-Bearing Status  weight-bearing as tolerated (WBAT)        Range of Motion (ROM)    Range of Motion  left lower extremity ROM deficit     Left Lower Extremity (ROM)  knee     Knee, Left (ROM)  8-86        Bed Mobility    [place], Supine to Sit  minimum assist (75% or more patient effort)     [place], Sit to Supine  minimum assist (75% or more patient effort)     Comment (Bed Mobility)  A for LLE to advance on/off bed.        Sit to Stand Transfer    [place], Sit to Stand Transfer  supervision     Assistive Device  walker, front-wheeled        Stand to Sit Transfer    [place], Stand to Sit Transfer  supervision     Assistive Device  walker, front-wheeled        Gait Training    [place], Gait  supervision     Assistive Device  walker, front-wheeled     Distance in Feet  150 feet     Gait Pattern Utilized  step-to;step-through     Deviations/Abnormal Patterns (Gait)  gait speed decreased;step length decreased;stride length decreased;antalgic     Maintains Weight-Bearing Status  able to maintain     Advanced Gait Activity  10 Meter Walk Test (Self-Selected Velocity)     Comment  slowed gait but steady w/ RW.        10 Meter Walk Test (Self-Selected Velocity)    Trial One: Ten Meter Walk Test (sec)  57.13 seconds     Trial Two: Ten Meter Walk Test (sec)  58.14 seconds     Trial One: Gait Speed (m/s)  0.11 m/s     Trial Two: Gait Speed (m/s)  0.1 m/s     Average Gait Speed (m/s): Two Trials  0.1 m/s        Stairs Training    [place], Stairs  touching/steadying assist     Assistive Device  railing     Handrail Location  right side (ascending);left side (descending)     Number of Stairs  10     Ascending  Stairs Technique  step-to-step     Descending Stairs Technique  step-to-step     Comment  cues for safety        AM-PAC (TM) - Mobility (Current Function)    Turning from your back to your side while in a flat bed without using bedrails?  3 - A Little     Moving from lying on your back to sitting on the side of a flat bed without using bedrails?  3 - A Little     Moving to and from a bed to a chair?  3 - A Little     Standing up from a chair using your arms?  3 - A Little     To walk in a hospital room?  3 - A Little     Climbing 3-5 steps with a railing?  3 - A Little     AM-PAC (TM) Mobility Score  18        Therapy Assessment/Plan (PT)    Rehab Potential (PT)  good, to achieve stated therapy goals     Therapy Frequency (PT)  daily        Progress Summary (PT)    Daily Outcome Statement (PT)  Pt progressing well.  Min A for bed mob, sup for STS and amb w/ RW.  Able to ascend/descend FF.  Rec home w/  home care services.     Symptoms Noted During/After Treatment  none        Therapy Plan Review/Discharge Plan (PT)    PT Recommended Discharge Disposition  home with assist;home with home health     Anticipated Equipment Needs at Discharge (PT Eval)  none        Plan of Care Review    Plan of Care Reviewed With  patient                       Education provided this session. See the Patient Education summary report for full details.    PT Goals      Most Recent Value   Bed Mobility Goal 1   Activity/Assistive Device  bed mobility activities, all at 03/15/2021 1420   [place]  modified independence at 03/15/2021 1420   Time Frame  2 days at 03/15/2021 1420   Progress/Outcome  goal ongoing at 03/15/2021 1420   Transfer Goal 1   Activity/Assistive Device  all transfers at 03/15/2021 1420   [place]  modified independence at 03/15/2021 1420   Time Frame  2 days at 03/15/2021 1420   Progress/Outcome  goal ongoing at 03/15/2021 1420   Gait Training Goal 1   Activity/Assistive Device  gait (walking locomotion) at  03/15/2021 1420   [place]  modified independence at 03/15/2021 1420   Distance  250 at 03/15/2021 1420   Time Frame  2 days at 03/15/2021 1420   Progress/Outcome  goal ongoing at 03/15/2021 1420   Stairs Goal 1   Activity/Assistive Device  stairs, all skills at 03/15/2021 1420   [place]  modified independence at 03/15/2021 1420   Number of Stairs  10 at 03/15/2021 1420   Time Frame  2 days at 03/15/2021 1420   Progress/Outcome  goal ongoing at 03/15/2021 1420

## 2021-03-16 NOTE — PROGRESS NOTES
Patient: Maria Elena Lowry  Location: [place] [address]  MRN: [id number]  Today's date: 3/16/2021     Pt supine in bed, bed alarm on, call bell within reach, and RN notified.      Maria Elena is a 75 y.o. female admitted on 3/15/2021 with DJD (degenerative joint disease) of knee. Principal problem is No Principal Problem: There is no principal problem currently on the Problem List. Please update the Problem List and refresh..    Past Medical History  Maria Elena has a past medical history of Arthritis, Breast cancer (CMS/[facility]) (2017), Estrogen receptor positive status (ER+), Hearing deficit, Hypertension, and Obesity.    History of Present Illness   L TKA    OT Vitals    Date/Time Pulse HR Source BP BP Location BP Method Pt Position [facility]   03/16/21 1017 68 Monitor 142/60 Right upper arm Automatic Lying JLD      OT Pain    Date/Time Pain Type Pref Pain Scale Location Rating: Rest Rating: Activity Interventions [facility]   03/16/21 1017 Pain Assessment number (Numeric Rating Pain Scale) knee 4 4 position adjusted JLD          Prior Living Environment      Most Recent Value   Living Arrangements  house   Living Environment Comment  staying with fam upon d/c   Number of Stairs, Main Entrance  10   Stair Railings, Main Entrance  railings safe and in good condition   Number of Stairs, Within Home, Primary  0          Prior Level of Function      Most Recent Value   Dominant Hand  right   Ambulation  assistive equipment   Transferring  assistive equipment   Toileting  independent   Bathing  independent   Dressing  independent   Eating  independent   Communication  understands/communicates without difficulty   Swallowing  swallows foods/liquids without difficulty   Assistive Device/Animal Currently Used at Home  commode, 3-in-1, cane, straight              OT Evaluation and Treatment - 03/16/21 1017        Time Calculation    Start Time  1017     Stop Time  1033     Time Calculation (min)  16 min        Session  Details    Document Type  initial evaluation     Mode of Treatment  occupational therapy        General Information    Patient Profile Reviewed?  yes     General Observations of Patient  pt rec'd supine in bed, agreeable to therapy     Existing Precautions/Restrictions  weight bearing        Weight-Bearing Status    Left LE Weight-Bearing Status  weight-bearing as tolerated (WBAT)        Cognition/Psychosocial    Affect/Mental Status (Cognitive)  WFL     Orientation Status (Cognition)  oriented x 3     Follows Commands (Cognition)  follows multi-step commands     Cognitive Function (Cognitive)  WFL        Sensory Assessment (Somatosensory)    Sensory Assessment (Somatosensory)  UE sensation intact        Range of Motion (ROM)    Range of Motion  ROM is WFL;bilateral upper extremities        Strength (Manual Muscle Testing)    Strength (Manual Muscle Testing)  strength is WFL;bilateral upper extremities        Bed Mobility    [place], Supine to Sit  supervision     [place], Sit to Supine  supervision     Assistive Device (Bed Mobility)  bed rails;head of bed elevated        Sit to Stand Transfer    [place], Sit to Stand Transfer  supervision     Assistive Device  walker, front-wheeled        Stand to Sit Transfer    [place], Stand to Sit Transfer  supervision     Assistive Device  walker, front-wheeled        Balance    Balance Test Results  Functional Reach Test        Functional Reach Test    Trial One: Functional Reach Test (in)  10 inches     Trial Two: Functional Reach Test (in)  11 inches     Average (in)  10.5 inches        Upper Body Dressing    Self-Performance  threads right arm, shirt;threads left arm, shirt     [place]  close supervision     Position  edge of bed sitting        Lower Body Dressing    Self-Performance  don;dons/doffs left sock;dons/doffs right sock     [place]  maximum assist (25-49% patient effort)     Position  edge of bed sitting     Comment  pt family to  assist        AM-PAC (TM) - ADL (Current Function)    Putting on and taking off regular lower body clothing?  2 - A Lot     Bathing?  2 - A Lot     Toileting?  3 - A Little     Putting on/taking off regular upper body clothing?  3 - A Little     How much help for taking care of personal grooming?  4 - None     Eating meals?  4 - None     AM-PAC (TM) ADL Score  18        Therapy Assessment/Plan (OT)    Rehab Potential (OT)  good, to achieve stated therapy goals     Therapy Frequency (OT)  5 times/wk     Criteria for Skilled Therapeutic Interventions Met (OT)  yes        Progress Summary (OT)    Daily Outcome Statement (OT)  OT Eval, pt maxA LB ADls, limited by pain, decr balance, decr standing arnoldo     Symptoms Noted During/After Treatment  none        Therapy Plan Review/Discharge Plan (OT)    OT Recommended Discharge Disposition  home with assist;home with home health     Anticipated Equipment Needs At Discharge (OT)  commode, 3-in-1;shower chair;walker, front-wheeled     Therapy Plan Review (OT)  evaluation/treatment results reviewed;patient                   Education provided this session. See the Patient Education summary report for full details.         OT Goals      Most Recent Value   Transfer Goal 1   Activity/Assistive Device  all transfers at 03/16/2021 1017   [place]  modified independence at 03/16/2021 1017   Time Frame  by discharge at 03/16/2021 1017   Progress/Outcome  goal ongoing at 03/16/2021 1017   Dressing Goal 1   Activity/Adaptive Equipment  dressing skills, all at 03/16/2021 1017   [place]  modified independence at 03/16/2021 1017   Time Frame  by discharge at 03/16/2021 1017   Progress/Outcome  goal ongoing at 03/16/2021 1017

## 2021-03-16 NOTE — DISCHARGE INSTRUCTIONS
Please HOLD Arimidex for 1 week.    Please follow up with your PCP for a sleep study as an outpatient     NIFEDIPINE XL 30mg was started in the hospital for elevated BP. Please get daily BP readings and call your pcp if systolic BP <100. Please follow up with your PCP in 1-2 weeks post op.       POST-OPERATIVE (AFTER SURGERY INSTRUCTIONS)-KNEE REPLACEMENT  Surgeon:  Dr. Eric Burks Nurse: KAYLAN Coley                                                                                                                                 1. ACTIVITY/REHABILITATION/PHYSICALTHERAPY (PT)  Your main goal is to get up and be active.  If you follow our PT /Home Exercise Program that details walking and 2 simple exercises done on your own, you will do great.  We have learned that patients “taking it easy” for the first couple of weeks with our Home Program handout, may have better outcomes. Overdoing or intense PT right after can cause inflammation, pain and swelling.  Balancing activity with rest you will have an easier, steadier recovery.  It is important, however, that you stretch your knee with our exercises so it doesn't tighten up.         1. Walk as much as you can without overdoing it 4-5 x daily 250-500 feet each walk.  2. Stretch your knee: straighten and bend (see exercise pictures in Home Program handout).  3. Take time to elevate your legs after walks   (see picture).  4. Never put a pillow just under your knee (this can lead to a “flexion contracture”,  your knee being stuck in the bent position) and try to avoid sleeping with your knee in a bent position    Every day you will feel stronger and more stable. You will advance from a walker to a cane sometime within a few weeks after surgery - you can do this whenever you feel comfortable.     2. SWELLING    It is NORMAL for your surgical leg to swell significantly after surgery mostly in the first two days after going home easing slightly thereafter. For  excessive swelling, you must spend more time elevating your leg well above your heart preferably on 4-5 pillows for 30-60 min.  a time, 5 times a day.  If not improving, please call the office for further instruction. Some swelling can persist for weeks to months after the procedure.    · It's good to apply ice for about 20 min. 3-4 times a day (not directly on your skin though) on the sides of the knees to help with pain and in the early days to limit swelling.    3. MEDICINES   Pain Medicine: The key medications for decreased narcotic use will be Tylenol (acetaminophen) and an anti-inflammatory such as Meloxicam or Celebrex (if indicated). These medications on a regular basis, for the first 2 weeks after will help recovery tremendously.  *If medically able, you will take 1000mg of Tylenol every 8 hours.* A prescription for opioid/narcotic Oxycodone, Hydrocodone, or Tramadol may be given to most.  Take as needed, but wean off as soon as you can. In some, narcotic addiction can begin after only 3 days of taking the medication.  You should notice that you need less prescription pain medicine over time. Good pain control is important so that you can do your exercise program. Do NOT drive while you are taking narcotic pain medicines.  Stop if you become dizzy or disoriented.  It is generally better to gradually wean off these medications then to stop abruptly.     Bowel Management/Mild Laxative (Sennokot/Miralax)  To avoid constipation caused by pain medications, anesthesia and decreased activity, take one daily dose until your bowels start moving then as needed.  If you are not constipated or you experience diarrhea, stop taking it.      Blood Thinners: Aspirin, Coumadin (Warfarin), Xarelto, Eliquis or Lovenox (Enoxaparin) - ONLY ONE of these medicines will be prescribed for you to reduce the risk of blood clots.  However, if your blood becomes too “thin”, you may see excessive bleeding (at the surgical site, gums, in  urine, rectum, etc.), severe bruising, or stomach upset.  Please call the office if this occurs.  Do not take vitamin K, vitamin E, or other herbal supplements until seen in the office for your post-op appt.  It is fine to take a multivitamin. Walking and ankle pumps are the best way to prevent blood clots!      DO NOT TAKE ANTI-INFLAMMATORY MEDICATIONS SUCH AS ADVIL, ALEVE, MOTRIN AND IBUPROFEN WHILE TAKING THE ABOVE BLOOD THINNING MEDICATIONS!!!  THIS CAN CAUSE DAMAGE TO YOUR STOMACH OR LEAD TO EXCESSIVE BLEEDING.     4. SURGICAL DRESSING/INCISION  Your incision is covered with an Aquacel dressing that should remain on for 7 days from surgery. After 7 days, you can remove the Aquacel dressing and leave incision open to air.  If you have any blood oozing from the incision then place a gauze pad and an adhesive bandage over the surgical site to apply some compression. Once removed, simply let the water of the shower and soap run over the incision daily and leave the incision open to air. Do not scrub the incision. Do not apply any lotions, creams, or ointments to your incision until fully closed and healed (4-6 weeks). Do not soak in a tub or swim until incision fully closed and healed (4-6 weeks). There are typically not any staples or sutures that will need to be removed from your incision at 2 weeks post op.  There are deep sutures under the skin that will dissolve over time.    5. SLEEPING     It is NORMAL to have difficulty sleeping after surgery and may take a few months to improve. Elevating your leg throughout the day will reduce swelling that builds up at night. We generally advise against taking sleeping medication, unless you were doing so prior to the surgery. There are no restrictions regarding sleep position except to avoid placing a pillow under your knee.  When lying on your side, you may place a pillow between your knees for comfort.  Also, to keep the knee straight, it may be helpful to sleep on  your back and avoid recliners that allow your knee to stay slightly bent.   7. DRIVING  You must have advanced to a cane, be off narcotics, and feel comfortable and safe to drive! You should drive only if you feel safe! Practice first in empty parking lot.  If in doubt, call us.     8. INFECTION PRECAUTIONS  You must wait until 4 months after surgery to schedule/have routine dental work or elective invasive procedures of any kind.  You will need to take an antibiotic approximately one hour before any future dental/invasive procedure appointments. You will be given a prescription at your four month appointment. Current recommendations are to continue to do this forever after your surgery.    9. FLYING/TRIPS                     By 6 weeks, you may travel.  On long trips (plane, train, auto), get up, stretch and walk around. Take an 81mg Aspirin on the morning and evening of your trip (if not on other blood thinners). Special cards stating that you have a knee replacement are no longer provided/accepted by the TSA. Just alert security before going through that you have a prosthetic.   10. DISABILITY FORMS  Please contact my nurse for this. Forms can be processed via our  and requires a signed release of information form and payment.  11. POST-OP APPOINTMENT: call [phone] if you do not have one scheduled.  Your appointment was made   when you scheduled surgery. Follow-up visits are scheduled at 2 weeks, 4 weeks and again at the 4 month   anai.     12. RETURN TO WORK   I expect my patients to return to work within 3 months from surgery.  Most patients return around 4-6 weeks post op. You can return when you feel ready.  Call my nurse if you need a return to work note.        13. BOWEL REGIMEN  Continue to take either Miralax daily or senna/colace twice a day until your bowel function has returned to normal. If you have not had a bowel movement by postoperative day 3, increase your Miralax to  twice a day and/or use a dulcolax suppository.  If you have not had a bowel movement by postoperative day 4 after increasing your bowel medications, contact your primary care provider for further management and treatment.     Dr. Burks Team Tips:  Please call at any time if you experience worsening pain, new numbness/tingling or weakness, wound redness or drainage, bleeding, loss of motion, fevers (temp above 101 F), or chills.  Never hesitate to call if you have a concern.  Idalia Lindsey, Medical Assistant: [phone] for Non-Emergent needs: (Mon-Fri 8:00am to 4:00pm)  Kem Johnson Number: [phone] for Emergency needs during or after business hours    · Pain medication refills- Call my nurse at least 3 business days in advance of running out of pain medication. Many narcotics/opioids can't be called into or faxed to your pharmacy. Patients may need to make arrangements to  a prescription in an office (where my nurse is) or have it mailed to their home.  I expect most patients to be completely off narcotics by their 1 month follow up visit.    · Normal expectations:   -  Temperature or low grade fever for a few weeks after surgery. Call if 101 degrees or higher.  -  Warmth around incision area, which can persist for up to 6 months.  -  Numbness around the incisional area (may resolve in 6 months to 1 year, but may be permanent). Similarly                you may feel a burning pain around the knee/ incision (this will ease as the swelling lessens in your leg).  - Difficultly raising or moving your leg on your own right after surgery. It might take a few weeks before you regain full strength. The exercises you do daily will help strengthen the leg.  -Some redness/pinkness to appear around the incision. Angry or bright redness that extends more than an inch     outward from the incision should be a concern and call my nurse if this occurs.   -Bruising up and down and along the back of the operative leg  and may take a long time to go away.  - It's normal to hear or feel clicking in the knee (which may go away as inflammation goes down).  - If you have increased swelling, you are likely overdoing it. Balance activity with rest and be sure to ELEVATE at Heart LEVEL throughout the day!!      *For your and/or your ’s information, important details about activity and expectations that was reviewed during your hospital stay, can be at found in our Discharge Video overview at [email or web address]

## 2021-06-22 ENCOUNTER — OFFICE VISIT (OUTPATIENT)
Dept: SURGERY | Facility: CLINIC | Age: 76
End: 2021-06-22
Payer: COMMERCIAL

## 2021-06-22 ENCOUNTER — TELEPHONE (OUTPATIENT)
Dept: SURGERY | Facility: CLINIC | Age: 76
End: 2021-06-22

## 2021-06-22 VITALS
DIASTOLIC BLOOD PRESSURE: 88 MMHG | WEIGHT: 198 LBS | HEIGHT: 62 IN | OXYGEN SATURATION: 98 % | HEART RATE: 68 BPM | SYSTOLIC BLOOD PRESSURE: 154 MMHG | BODY MASS INDEX: 36.44 KG/M2

## 2021-06-22 DIAGNOSIS — Z17.0 MALIGNANT NEOPLASM OF CENTRAL PORTION OF LEFT BREAST IN FEMALE, ESTROGEN RECEPTOR POSITIVE (CMS/HCC): Primary | ICD-10-CM

## 2021-06-22 DIAGNOSIS — C50.112 MALIGNANT NEOPLASM OF CENTRAL PORTION OF LEFT BREAST IN FEMALE, ESTROGEN RECEPTOR POSITIVE (CMS/HCC): Primary | ICD-10-CM

## 2021-06-22 PROCEDURE — 3077F SYST BP >= 140 MM HG: CPT | Performed by: SURGERY

## 2021-06-22 PROCEDURE — 3079F DIAST BP 80-89 MM HG: CPT | Performed by: SURGERY

## 2021-06-22 PROCEDURE — 3008F BODY MASS INDEX DOCD: CPT | Performed by: SURGERY

## 2021-06-22 PROCEDURE — 99213 OFFICE O/P EST LOW 20 MIN: CPT | Performed by: SURGERY

## 2021-06-22 ASSESSMENT — ENCOUNTER SYMPTOMS
CONSTITUTIONAL NEGATIVE: 1
ENDOCRINE NEGATIVE: 1
ALLERGIC/IMMUNOLOGIC NEGATIVE: 1
HEMATOLOGIC/LYMPHATIC NEGATIVE: 1
NEUROLOGICAL NEGATIVE: 1
MUSCULOSKELETAL NEGATIVE: 1
GASTROINTESTINAL NEGATIVE: 1
RESPIRATORY NEGATIVE: 1
CARDIOVASCULAR NEGATIVE: 1
PSYCHIATRIC NEGATIVE: 1

## 2021-06-22 NOTE — PROGRESS NOTES
Patient ID: Maria Elena Lowry                              : 1945  MRN: [id number]                                            Visit Date: 2021  Encounter Provider: Jose Luis Burgess  Referring Provider: No ref. provider found    Subjective   Chief Complaint: Follow-up (6- month)    Maria Elena Lowry is a 75 y.o. old female presenting today for follow-up of her previously treated left breast cancer.  She is status post left lumpectomy and radiation for T1 N0 M0 stage I infiltrating ductal breast carcinoma.  Her last mammogram was 6 months ago.  She has no major complaints this time.  She remains on anastrozole..      Medications:   Current Outpatient Medications:   •  ciclopirox (PENLAC) 8 % solution, DO NOT PLACE AROUND THE LEFT KNEE, Disp: , Rfl:   •  hydrochlorothiazide (HYDRODIURIL) 12.5 mg tablet, Take 12.5 mg by mouth daily., Disp: , Rfl:   •  multivitamin-min-iron-FA-vit K (MULTI FOR HER) 18 mg iron-600 mcg-40 mcg capsule, Take 1 tablet by mouth daily.  , Disp: , Rfl:   •  anastrozole (ARIMIDEX) 1 mg tablet, Take  1 tablet (1 mg total) daily  HOLD for 1 week post op. Can resume on 3/23/21, Disp: 30 tablet, Rfl: 0  •  aspirin 81 mg enteric coated tablet, Take 1 tablet (81 mg total) by mouth 2 (two) times a day. Take Asprin for 4 weeks. Please obtain over the counter., Disp: 60 tablet, Rfl: 0  •  celecoxib (CeleBREX) 200 mg capsule, Take 1 capsule (200 mg total) by mouth daily., Disp: 30 capsule, Rfl: 0  •  NIFEdipine XL (PROCARDIA XL) 30 mg 24 hr tablet, Take 1 tablet (30 mg total) by mouth daily., Disp: 30 tablet, Rfl: 0  •  polyethylene glycol (MIRALAX) 17 gram packet, Take 17 g by mouth daily as needed (constipation). Please obtain over the counter, Disp: , Rfl:   •  sennosides-docusate sodium (SENOKOT-S) 8.6-50 mg, Take 1 tablet by mouth 2 (two) times a day as needed for constipation. Please obtain over the counter, Disp: , Rfl:   •  triamcinolone (KENALOG) 0.1 % ointment, Apply 1 application  "topically as needed for irritation or rash. DO NOT PLACE AROUND THE LEFT KNEE, Disp: 30 g, Rfl: 1    Past Medical History:  has a past medical history of Arthritis, Breast cancer (CMS/[facility]) (2017), Estrogen receptor positive status (ER+), Hearing deficit, Hypertension, and Obesity.  Past Surgical History:  has a past surgical history that includes Breast biopsy; Breast lumpectomy (Left); [place] lymph node biopsy; Ankle ligament reconstruction (Right); Tonsillectomy; and Colonoscopy.  Social History:  reports that she has never smoked. She has never used smokeless tobacco. She reports current alcohol use. She reports that she does not use drugs.  Family History: family history includes Brain cancer in her biological sister; Breast cancer in her biological mother; Colon cancer in her biological mother; Dementia in her biological father; Diabetes in her biological brother; Heart disease in her biological brother; Hypertension in her biological brother, biological father, and biological sister.  Allergies: is allergic to no known drug allergies.     Review of Systems   Constitutional: Negative.    HENT: Negative.    Respiratory: Negative.    Cardiovascular: Negative.    Gastrointestinal: Negative.    Endocrine: Negative.    Genitourinary: Negative.    Musculoskeletal: Negative.    Skin: Negative.    Allergic/Immunologic: Negative.    Neurological: Negative.    Hematological: Negative.    Psychiatric/Behavioral: Negative.      The following have been reviewed and updated as appropriate in this visit:         Objective   Vitals:   Visit Vitals  BP (!) 154/88 (BP Location: Right forearm, Patient Position: Sitting)   Pulse 68   Ht 1.575 m (5' 2\")   Wt 89.8 kg (198 lb)   SpO2 98%   BMI 36.21 kg/m²     Physical Exam      Physical Exam   Constitutional: She is oriented to person, place, and time. She appears well-developed and well-nourished.   Eyes: Pupils are equal, round, and reactive to light.   Neck: Normal range of " motion. Neck supple.   Cardiovascular: Normal rate, regular rhythm, normal heart sounds and intact distal pulses.    Pulmonary/Chest: Effort normal and breath sounds normal.   Abdominal: Soft. Bowel sounds are normal.   Neurological: She is alert and oriented to person, place, and time.   Skin: Skin is warm and dry.   Psychiatric: She has a normal mood and affect. Her behavior is normal. Judgment and thought content normal.   Breasts- there are no masses in either breast, no retractions skin or nipple, no nipple discharge could be demonstrated, there is no evidence of axillary supraclavicular adenopathy.    Assessment/Plan   Problem List Items Addressed This Visit     None        She had a normal mammogram 6 months ago and a normal physical exam.  She is clinically ANNA at 47 months.  I plan to see her back in 6 months time with a bilateral mammogram.  No follow-ups on file.       Anna Burgess MD

## 2021-11-30 ENCOUNTER — TRANSCRIBE ORDERS (OUTPATIENT)
Dept: SCHEDULING | Age: 76
End: 2021-11-30
Payer: COMMERCIAL

## 2021-11-30 DIAGNOSIS — M81.0 AGE-RELATED OSTEOPOROSIS WITHOUT CURRENT PATHOLOGICAL FRACTURE: Primary | ICD-10-CM

## 2021-12-23 ENCOUNTER — HOSPITAL ENCOUNTER (OUTPATIENT)
Dept: RADIOLOGY | Facility: HOSPITAL | Age: 76
Discharge: HOME | End: 2021-12-23
Attending: PHYSICIAN ASSISTANT
Payer: COMMERCIAL

## 2021-12-23 ENCOUNTER — OFFICE VISIT (OUTPATIENT)
Dept: SURGERY | Facility: CLINIC | Age: 76
End: 2021-12-23
Payer: COMMERCIAL

## 2021-12-23 VITALS
BODY MASS INDEX: 36.8 KG/M2 | DIASTOLIC BLOOD PRESSURE: 91 MMHG | HEART RATE: 68 BPM | SYSTOLIC BLOOD PRESSURE: 182 MMHG | HEIGHT: 62 IN | WEIGHT: 200 LBS

## 2021-12-23 DIAGNOSIS — C50.112 MALIGNANT NEOPLASM OF CENTRAL PORTION OF LEFT BREAST IN FEMALE, ESTROGEN RECEPTOR POSITIVE (CMS/HCC): Primary | ICD-10-CM

## 2021-12-23 DIAGNOSIS — C50.112 MALIGNANT NEOPLASM OF CENTRAL PORTION OF LEFT BREAST IN FEMALE, ESTROGEN RECEPTOR POSITIVE (CMS/HCC): ICD-10-CM

## 2021-12-23 DIAGNOSIS — Z17.0 MALIGNANT NEOPLASM OF CENTRAL PORTION OF LEFT BREAST IN FEMALE, ESTROGEN RECEPTOR POSITIVE (CMS/HCC): ICD-10-CM

## 2021-12-23 DIAGNOSIS — Z17.0 MALIGNANT NEOPLASM OF CENTRAL PORTION OF LEFT BREAST IN FEMALE, ESTROGEN RECEPTOR POSITIVE (CMS/HCC): Primary | ICD-10-CM

## 2021-12-23 PROCEDURE — 3080F DIAST BP >= 90 MM HG: CPT | Performed by: SURGERY

## 2021-12-23 PROCEDURE — 99213 OFFICE O/P EST LOW 20 MIN: CPT | Performed by: SURGERY

## 2021-12-23 PROCEDURE — 3008F BODY MASS INDEX DOCD: CPT | Performed by: SURGERY

## 2021-12-23 PROCEDURE — 77066 DX MAMMO INCL CAD BI: CPT

## 2021-12-23 PROCEDURE — 3077F SYST BP >= 140 MM HG: CPT | Performed by: SURGERY

## 2021-12-23 ASSESSMENT — ENCOUNTER SYMPTOMS
PSYCHIATRIC NEGATIVE: 1
MUSCULOSKELETAL NEGATIVE: 1
NEUROLOGICAL NEGATIVE: 1
HEMATOLOGIC/LYMPHATIC NEGATIVE: 1
CARDIOVASCULAR NEGATIVE: 1
CONSTITUTIONAL NEGATIVE: 1
ENDOCRINE NEGATIVE: 1
GASTROINTESTINAL NEGATIVE: 1
ALLERGIC/IMMUNOLOGIC NEGATIVE: 1
RESPIRATORY NEGATIVE: 1

## 2021-12-23 NOTE — PROGRESS NOTES
Patient ID: Maria Elena Lowry                              : 1945  MRN: [id number]                                            Visit Date: 2021  Encounter Provider: Jose Luis Burgess  Referring Provider: No ref. provider found    Subjective   Chief Complaint: No chief complaint on file.    Maria Elena Lowry is a 76 y.o. old female presenting today for follow-up of her previously treated left breast cancer.  She is status post left lumpectomy radiation for T1 N0 M0 stage I infiltrating ductal breast carcinoma.  She had bilateral mammogram that showed nothing high risk and no major complaints this time.  She remains on anastrozole..      Medications:   Current Outpatient Medications:   •  ciclopirox (PENLAC) 8 % solution, DO NOT PLACE AROUND THE LEFT KNEE, Disp: , Rfl:   •  hydrochlorothiazide (HYDRODIURIL) 12.5 mg tablet, Take 12.5 mg by mouth daily., Disp: , Rfl:   •  multivitamin-min-iron-FA-vit K (MULTI FOR HER) 18 mg iron-600 mcg-40 mcg capsule, Take 1 tablet by mouth daily.  , Disp: , Rfl:   •  anastrozole (ARIMIDEX) 1 mg tablet, Take  1 tablet (1 mg total) daily  HOLD for 1 week post op. Can resume on 3/23/21, Disp: 30 tablet, Rfl: 0  •  aspirin 81 mg enteric coated tablet, Take 1 tablet (81 mg total) by mouth 2 (two) times a day. Take Asprin for 4 weeks. Please obtain over the counter., Disp: 60 tablet, Rfl: 0  •  celecoxib (CeleBREX) 200 mg capsule, Take 1 capsule (200 mg total) by mouth daily., Disp: 30 capsule, Rfl: 0  •  NIFEdipine XL (PROCARDIA XL) 30 mg 24 hr tablet, Take 1 tablet (30 mg total) by mouth daily., Disp: 30 tablet, Rfl: 0  •  polyethylene glycol (MIRALAX) 17 gram packet, Take 17 g by mouth daily as needed (constipation). Please obtain over the counter, Disp: , Rfl:   •  sennosides-docusate sodium (SENOKOT-S) 8.6-50 mg, Take 1 tablet by mouth 2 (two) times a day as needed for constipation. Please obtain over the counter, Disp: , Rfl:   •  triamcinolone (KENALOG) 0.1 % ointment, Apply 1  "application topically as needed for irritation or rash. DO NOT PLACE AROUND THE LEFT KNEE, Disp: 30 g, Rfl: 1    Past Medical History:  has a past medical history of Arthritis, Breast cancer (CMS/[facility]) (2017), Estrogen receptor positive status (ER+), Hearing deficit, Hypertension, and Obesity.  Past Surgical History:  has a past surgical history that includes Breast biopsy; Breast lumpectomy (Left); [place] lymph node biopsy; Ankle ligament reconstruction (Right); Tonsillectomy; and Colonoscopy.  Social History:  reports that she has never smoked. She has never used smokeless tobacco. She reports current alcohol use. She reports that she does not use drugs.  Family History: family history includes Brain cancer in her biological sister; Breast cancer in her biological mother; Colon cancer in her biological mother; Dementia in her biological father; Diabetes in her biological brother; Heart disease in her biological brother; Hypertension in her biological brother, biological father, and biological sister.  Allergies: is allergic to no known drug allergies.     Review of Systems   Constitutional: Negative.    HENT: Negative.    Respiratory: Negative.    Cardiovascular: Negative.    Gastrointestinal: Negative.    Endocrine: Negative.    Genitourinary: Negative.    Musculoskeletal: Negative.    Skin: Negative.    Allergic/Immunologic: Negative.    Neurological: Negative.    Hematological: Negative.    Psychiatric/Behavioral: Negative.      The following have been reviewed and updated as appropriate in this visit:          Objective   Vitals:   Visit Vitals  BP (!) 182/91   Pulse 68   Ht 1.575 m (5' 2\")   Wt 90.7 kg (200 lb)   BMI 36.58 kg/m²     Physical Exam  Physical Exam   Constitutional: She is oriented to person, place, and time. She appears well-developed and well-nourished.   Eyes: Pupils are equal, round, and reactive to light.   Neck: Normal range of motion. Neck supple.   Cardiovascular: Normal rate, regular " rhythm, normal heart sounds and intact distal pulses.    Pulmonary/Chest: Effort normal and breath sounds normal.   Abdominal: Soft. Bowel sounds are normal.   Neurological: She is alert and oriented to person, place, and time.   Skin: Skin is warm and dry.   Psychiatric: She has a normal mood and affect. Her behavior is normal. Judgment and thought content normal.   Breasts- there are no masses in either breast, no retractions skin or nipple, no nipple discharge could be demonstrated, there is no evidence of axillary supraclavicular adenopathy.       Assessment/Plan   Problem List Items Addressed This Visit     None        She had a normal mammogram normal physical exam.  She is clinically ANNA at 53 months.  I plan to see her back in 6 months time.  No follow-ups on file.       Anna Burgess MD

## 2022-05-26 ENCOUNTER — OFFICE VISIT (OUTPATIENT)
Dept: SURGERY | Facility: CLINIC | Age: 77
End: 2022-05-26
Payer: COMMERCIAL

## 2022-05-26 VITALS
BODY MASS INDEX: 32.76 KG/M2 | DIASTOLIC BLOOD PRESSURE: 73 MMHG | HEART RATE: 63 BPM | SYSTOLIC BLOOD PRESSURE: 148 MMHG | HEIGHT: 62 IN | WEIGHT: 178 LBS

## 2022-05-26 DIAGNOSIS — Z17.0 MALIGNANT NEOPLASM OF CENTRAL PORTION OF LEFT BREAST IN FEMALE, ESTROGEN RECEPTOR POSITIVE (CMS/HCC): Primary | ICD-10-CM

## 2022-05-26 DIAGNOSIS — C50.112 MALIGNANT NEOPLASM OF CENTRAL PORTION OF LEFT BREAST IN FEMALE, ESTROGEN RECEPTOR POSITIVE (CMS/HCC): Primary | ICD-10-CM

## 2022-05-26 PROCEDURE — 3077F SYST BP >= 140 MM HG: CPT | Performed by: SURGERY

## 2022-05-26 PROCEDURE — 99213 OFFICE O/P EST LOW 20 MIN: CPT | Performed by: SURGERY

## 2022-05-26 PROCEDURE — 3008F BODY MASS INDEX DOCD: CPT | Performed by: SURGERY

## 2022-05-26 PROCEDURE — 3078F DIAST BP <80 MM HG: CPT | Performed by: SURGERY

## 2022-05-26 ASSESSMENT — ENCOUNTER SYMPTOMS
ENDOCRINE NEGATIVE: 1
CONSTITUTIONAL NEGATIVE: 1
CARDIOVASCULAR NEGATIVE: 1
NEUROLOGICAL NEGATIVE: 1
PSYCHIATRIC NEGATIVE: 1
HEMATOLOGIC/LYMPHATIC NEGATIVE: 1
GASTROINTESTINAL NEGATIVE: 1
MUSCULOSKELETAL NEGATIVE: 1
RESPIRATORY NEGATIVE: 1
ALLERGIC/IMMUNOLOGIC NEGATIVE: 1

## 2022-05-26 NOTE — PROGRESS NOTES
Patient ID: Maria Elena Lowry                              : 1945  MRN: [id number]                                            Visit Date: 2022  Encounter Provider: Jose Luis Burgess  Referring Provider: No ref. provider found    Subjective   Chief Complaint: Follow-up    Maria Elena Lowry is a 76 y.o. old female presenting today for follow-up of her previously treated left breast cancer.  She is status post left lumpectomy and radiation for T1 N0 M0 stage I infiltrating ductal breast carcinoma.  She had a bilateral mammogram 6 months ago which showed nothing high risk.  She remains on anastrozole.  She is here for an evaluation..      Medications:   Current Outpatient Medications:   •  anastrozole (ARIMIDEX) 1 mg tablet, Take  1 tablet (1 mg total) daily  HOLD for 1 week post op. Can resume on 3/23/21, Disp: 30 tablet, Rfl: 0  •  hydrochlorothiazide (HYDRODIURIL) 12.5 mg tablet, Take 12.5 mg by mouth daily., Disp: , Rfl:   •  multivitamin-min-iron-FA-vit K 18 mg iron-600 mcg-40 mcg capsule, Take 1 tablet by mouth daily.  , Disp: , Rfl:   •  aspirin 81 mg enteric coated tablet, Take 1 tablet (81 mg total) by mouth 2 (two) times a day. Take Asprin for 4 weeks. Please obtain over the counter., Disp: 60 tablet, Rfl: 0  •  celecoxib (CeleBREX) 200 mg capsule, Take 1 capsule (200 mg total) by mouth daily., Disp: 30 capsule, Rfl: 0  •  ciclopirox (PENLAC) 8 % solution, DO NOT PLACE AROUND THE LEFT KNEE (Patient not taking: Reported on 2022), Disp: , Rfl:   •  NIFEdipine XL (PROCARDIA XL) 30 mg 24 hr tablet, Take 1 tablet (30 mg total) by mouth daily., Disp: 30 tablet, Rfl: 0  •  polyethylene glycol (MIRALAX) 17 gram packet, Take 17 g by mouth daily as needed (constipation). Please obtain over the counter, Disp: , Rfl:   •  sennosides-docusate sodium (SENOKOT-S) 8.6-50 mg, Take 1 tablet by mouth 2 (two) times a day as needed for constipation. Please obtain over the counter, Disp: , Rfl:   •  triamcinolone  "(KENALOG) 0.1 % ointment, Apply 1 application topically as needed for irritation or rash. DO NOT PLACE AROUND THE LEFT KNEE, Disp: 30 g, Rfl: 1    Past Medical History:  has a past medical history of Arthritis, Breast cancer (CMS/[facility]) (2017), Estrogen receptor positive status (ER+), Hearing deficit, Hypertension, and Obesity.  Past Surgical History:  has a past surgical history that includes Breast biopsy; Breast lumpectomy (Left); [place] lymph node biopsy; Ankle ligament reconstruction (Right); Tonsillectomy; and Colonoscopy.  Social History:   Social History     Tobacco Use   • Smoking status: Never Smoker   • Smokeless tobacco: Never Used   Substance Use Topics   • Alcohol use: Yes     Comment: 2 small glasses wine/week   • Drug use: No     Family History: family history includes Brain cancer in her biological sister; Breast cancer in her biological mother; Colon cancer in her biological mother; Dementia in her biological father; Diabetes in her biological brother; Heart disease in her biological brother; Hypertension in her biological brother, biological father, and biological sister.  Allergies: is allergic to no known drug allergies.     Review of Systems   Constitutional: Negative.    HENT: Negative.    Respiratory: Negative.    Cardiovascular: Negative.    Gastrointestinal: Negative.    Endocrine: Negative.    Genitourinary: Negative.    Musculoskeletal: Negative.    Skin: Negative.    Allergic/Immunologic: Negative.    Neurological: Negative.    Hematological: Negative.    Psychiatric/Behavioral: Negative.      The following have been reviewed and updated as appropriate in this visit:            Objective   Vitals:   Visit Vitals  BP (!) 148/73   Pulse 63   Ht 1.575 m (5' 2\")   Wt 80.7 kg (178 lb)   BMI 32.56 kg/m²     Physical Exam      Physical Exam   Constitutional: She is oriented to person, place, and time. She appears well-developed and well-nourished.   Eyes: Pupils are equal, round, and reactive " to light.   Neck: Normal range of motion. Neck supple.   Cardiovascular: Normal rate, regular rhythm, normal heart sounds and intact distal pulses.    Pulmonary/Chest: Effort normal and breath sounds normal.   Abdominal: Soft. Bowel sounds are normal.   Neurological: She is alert and oriented to person, place, and time.   Skin: Skin is warm and dry.   Psychiatric: She has a normal mood and affect. Her behavior is normal. Judgment and thought content normal.   Breasts- there are no masses in either breast, no retractions skin or nipple, no nipple discharge could be demonstrated, there is no evidence of axillary supraclavicular adenopathy.    Assessment/Plan   Problem List Items Addressed This Visit    None       Had a relatively normal physical exam with a normal mammogram 6 months ago.  She remains clinically ANNA at 59 months.  I will see her back in 6 months with a bilateral mammogram and then annually thereafter.  No follow-ups on file.       Anna Burgess MD

## 2022-06-10 ENCOUNTER — HOSPITAL ENCOUNTER (OUTPATIENT)
Dept: RADIOLOGY | Facility: HOSPITAL | Age: 77
Discharge: HOME | End: 2022-06-10
Attending: INTERNAL MEDICINE
Payer: COMMERCIAL

## 2022-06-10 DIAGNOSIS — M81.0 AGE-RELATED OSTEOPOROSIS WITHOUT CURRENT PATHOLOGICAL FRACTURE: ICD-10-CM

## 2022-06-10 PROCEDURE — 77080 DXA BONE DENSITY AXIAL: CPT

## 2022-07-01 ENCOUNTER — APPOINTMENT (RX ONLY)
Dept: URBAN - METROPOLITAN AREA CLINIC 28 | Facility: CLINIC | Age: 77
Setting detail: DERMATOLOGY
End: 2022-07-01

## 2022-07-01 DIAGNOSIS — D18.0 HEMANGIOMA: ICD-10-CM

## 2022-07-01 DIAGNOSIS — L82.1 OTHER SEBORRHEIC KERATOSIS: ICD-10-CM

## 2022-07-01 DIAGNOSIS — L81.4 OTHER MELANIN HYPERPIGMENTATION: ICD-10-CM

## 2022-07-01 DIAGNOSIS — M71 OTHER BURSOPATHIES: ICD-10-CM

## 2022-07-01 DIAGNOSIS — D22 MELANOCYTIC NEVI: ICD-10-CM

## 2022-07-01 DIAGNOSIS — Z71.89 OTHER SPECIFIED COUNSELING: ICD-10-CM

## 2022-07-01 PROBLEM — D18.01 HEMANGIOMA OF SKIN AND SUBCUTANEOUS TISSUE: Status: ACTIVE | Noted: 2022-07-01

## 2022-07-01 PROBLEM — D22.61 MELANOCYTIC NEVI OF RIGHT UPPER LIMB, INCLUDING SHOULDER: Status: ACTIVE | Noted: 2022-07-01

## 2022-07-01 PROBLEM — D22.71 MELANOCYTIC NEVI OF RIGHT LOWER LIMB, INCLUDING HIP: Status: ACTIVE | Noted: 2022-07-01

## 2022-07-01 PROBLEM — D22.62 MELANOCYTIC NEVI OF LEFT UPPER LIMB, INCLUDING SHOULDER: Status: ACTIVE | Noted: 2022-07-01

## 2022-07-01 PROBLEM — D22.5 MELANOCYTIC NEVI OF TRUNK: Status: ACTIVE | Noted: 2022-07-01

## 2022-07-01 PROBLEM — D22.72 MELANOCYTIC NEVI OF LEFT LOWER LIMB, INCLUDING HIP: Status: ACTIVE | Noted: 2022-07-01

## 2022-07-01 PROBLEM — M71.342 OTHER BURSAL CYST, LEFT HAND: Status: ACTIVE | Noted: 2022-07-01

## 2022-07-01 PROCEDURE — ? COUNSELING

## 2022-07-01 PROCEDURE — 99203 OFFICE O/P NEW LOW 30 MIN: CPT

## 2022-07-01 PROCEDURE — ? FULL BODY SKIN EXAM

## 2022-07-01 PROCEDURE — ? RECOMMENDATIONS

## 2022-07-01 PROCEDURE — ? SUNSCREEN RECOMMENDATIONS

## 2022-07-01 ASSESSMENT — LOCATION DETAILED DESCRIPTION DERM
LOCATION DETAILED: RIGHT SUPERIOR MEDIAL UPPER BACK
LOCATION DETAILED: RIGHT MEDIAL SUPERIOR CHEST
LOCATION DETAILED: EPIGASTRIC SKIN
LOCATION DETAILED: LEFT SUPERIOR MEDIAL UPPER BACK
LOCATION DETAILED: DORSAL INTERPHALANGEAL JOINT LEFT THUMB
LOCATION DETAILED: RIGHT INFERIOR MEDIAL UPPER BACK
LOCATION DETAILED: LEFT ANTERIOR PROXIMAL THIGH
LOCATION DETAILED: RIGHT ANTERIOR DISTAL THIGH
LOCATION DETAILED: LEFT ANTERIOR PROXIMAL UPPER ARM
LOCATION DETAILED: LEFT ANTERIOR DISTAL THIGH
LOCATION DETAILED: UPPER STERNUM
LOCATION DETAILED: RIGHT ANTERIOR PROXIMAL UPPER ARM
LOCATION DETAILED: INFERIOR THORACIC SPINE
LOCATION DETAILED: RIGHT ANTERIOR DISTAL UPPER ARM

## 2022-07-01 ASSESSMENT — LOCATION SIMPLE DESCRIPTION DERM
LOCATION SIMPLE: UPPER BACK
LOCATION SIMPLE: RIGHT UPPER BACK
LOCATION SIMPLE: LEFT UPPER ARM
LOCATION SIMPLE: LEFT THUMB
LOCATION SIMPLE: LEFT UPPER BACK
LOCATION SIMPLE: LEFT THIGH
LOCATION SIMPLE: RIGHT THIGH
LOCATION SIMPLE: ABDOMEN
LOCATION SIMPLE: RIGHT UPPER ARM
LOCATION SIMPLE: CHEST

## 2022-07-01 ASSESSMENT — LOCATION ZONE DERM
LOCATION ZONE: LEG
LOCATION ZONE: FINGER
LOCATION ZONE: ARM
LOCATION ZONE: TRUNK

## 2022-07-01 NOTE — PROCEDURE: FULL BODY SKIN EXAM
Detail Level: Generalized
Instructions: This plan will send the code FBSE to the PM system.  DO NOT or CHANGE the price.
Body Of Note (Please Add Your Own Text Here): monitor annually
Price (Do Not Change): 0.00

## 2022-07-01 NOTE — PROCEDURE: RECOMMENDATIONS
Detail Level: Zone
Recommendations (Free Text): see hand surgeon for removal
Render Risk Assessment In Note?: no
Recommendation Preamble: The following recommendations were made during the visit:

## 2022-12-08 ENCOUNTER — TRANSCRIBE ORDERS (OUTPATIENT)
Dept: SCHEDULING | Age: 77
End: 2022-12-08

## 2022-12-08 DIAGNOSIS — Z12.31 ENCOUNTER FOR SCREENING MAMMOGRAM FOR MALIGNANT NEOPLASM OF BREAST: Primary | ICD-10-CM

## 2022-12-26 ENCOUNTER — HOSPITAL ENCOUNTER (OUTPATIENT)
Dept: RADIOLOGY | Facility: HOSPITAL | Age: 77
Discharge: HOME | End: 2022-12-26
Attending: SURGERY
Payer: COMMERCIAL

## 2022-12-26 ENCOUNTER — TRANSCRIBE ORDERS (OUTPATIENT)
Dept: RADIOLOGY | Facility: HOSPITAL | Age: 77
End: 2022-12-26

## 2022-12-26 DIAGNOSIS — Z12.31 ENCOUNTER FOR SCREENING MAMMOGRAM FOR MALIGNANT NEOPLASM OF BREAST: ICD-10-CM

## 2022-12-26 DIAGNOSIS — Z12.31 ENCOUNTER FOR SCREENING MAMMOGRAM FOR MALIGNANT NEOPLASM OF BREAST: Primary | ICD-10-CM

## 2022-12-26 PROCEDURE — 77067 SCR MAMMO BI INCL CAD: CPT

## 2022-12-27 ENCOUNTER — HOSPITAL ENCOUNTER (OUTPATIENT)
Dept: RADIOLOGY | Facility: HOSPITAL | Age: 77
Discharge: HOME | End: 2022-12-27
Attending: RADIOLOGY
Payer: COMMERCIAL

## 2022-12-27 DIAGNOSIS — R92.8 ABNORMAL MAMMOGRAM: ICD-10-CM

## 2022-12-27 PROCEDURE — G0279 TOMOSYNTHESIS, MAMMO: HCPCS | Mod: RT

## 2022-12-27 PROCEDURE — 76642 ULTRASOUND BREAST LIMITED: CPT | Mod: RT

## 2023-07-07 ENCOUNTER — APPOINTMENT (RX ONLY)
Dept: URBAN - METROPOLITAN AREA CLINIC 28 | Facility: CLINIC | Age: 78
Setting detail: DERMATOLOGY
End: 2023-07-07

## 2023-07-07 DIAGNOSIS — Z71.89 OTHER SPECIFIED COUNSELING: ICD-10-CM

## 2023-07-07 DIAGNOSIS — D22 MELANOCYTIC NEVI: ICD-10-CM

## 2023-07-07 DIAGNOSIS — D18.0 HEMANGIOMA: ICD-10-CM

## 2023-07-07 DIAGNOSIS — L81.4 OTHER MELANIN HYPERPIGMENTATION: ICD-10-CM

## 2023-07-07 DIAGNOSIS — L82.1 OTHER SEBORRHEIC KERATOSIS: ICD-10-CM

## 2023-07-07 DIAGNOSIS — M71 OTHER BURSOPATHIES: ICD-10-CM | Status: WORSENING

## 2023-07-07 PROBLEM — D18.01 HEMANGIOMA OF SKIN AND SUBCUTANEOUS TISSUE: Status: ACTIVE | Noted: 2023-07-07

## 2023-07-07 PROBLEM — D22.5 MELANOCYTIC NEVI OF TRUNK: Status: ACTIVE | Noted: 2023-07-07

## 2023-07-07 PROBLEM — D22.61 MELANOCYTIC NEVI OF RIGHT UPPER LIMB, INCLUDING SHOULDER: Status: ACTIVE | Noted: 2023-07-07

## 2023-07-07 PROBLEM — D22.62 MELANOCYTIC NEVI OF LEFT UPPER LIMB, INCLUDING SHOULDER: Status: ACTIVE | Noted: 2023-07-07

## 2023-07-07 PROBLEM — M71.342 OTHER BURSAL CYST, LEFT HAND: Status: ACTIVE | Noted: 2023-07-07

## 2023-07-07 PROBLEM — D22.71 MELANOCYTIC NEVI OF RIGHT LOWER LIMB, INCLUDING HIP: Status: ACTIVE | Noted: 2023-07-07

## 2023-07-07 PROBLEM — D22.72 MELANOCYTIC NEVI OF LEFT LOWER LIMB, INCLUDING HIP: Status: ACTIVE | Noted: 2023-07-07

## 2023-07-07 PROCEDURE — 99213 OFFICE O/P EST LOW 20 MIN: CPT

## 2023-07-07 PROCEDURE — ? DEFER

## 2023-07-07 PROCEDURE — ? FULL BODY SKIN EXAM

## 2023-07-07 PROCEDURE — ? COUNSELING

## 2023-07-07 PROCEDURE — ? SUNSCREEN RECOMMENDATIONS

## 2023-07-07 PROCEDURE — ? RECOMMENDATIONS

## 2023-07-07 ASSESSMENT — LOCATION DETAILED DESCRIPTION DERM
LOCATION DETAILED: DORSAL INTERPHALANGEAL JOINT LEFT THUMB
LOCATION DETAILED: INFERIOR THORACIC SPINE
LOCATION DETAILED: LEFT ANTERIOR PROXIMAL THIGH
LOCATION DETAILED: RIGHT ANTERIOR PROXIMAL UPPER ARM
LOCATION DETAILED: LEFT ANTERIOR DISTAL THIGH
LOCATION DETAILED: LEFT POSTERIOR SHOULDER
LOCATION DETAILED: RIGHT SUPERIOR MEDIAL UPPER BACK
LOCATION DETAILED: EPIGASTRIC SKIN
LOCATION DETAILED: SUPERIOR LUMBAR SPINE
LOCATION DETAILED: RIGHT ANTERIOR DISTAL THIGH
LOCATION DETAILED: LEFT SUPERIOR MEDIAL UPPER BACK
LOCATION DETAILED: RIGHT POSTERIOR SHOULDER
LOCATION DETAILED: LEFT ANTERIOR PROXIMAL UPPER ARM
LOCATION DETAILED: RIGHT MEDIAL SUPERIOR CHEST
LOCATION DETAILED: RIGHT ANTERIOR DISTAL UPPER ARM

## 2023-07-07 ASSESSMENT — LOCATION SIMPLE DESCRIPTION DERM
LOCATION SIMPLE: LOWER BACK
LOCATION SIMPLE: RIGHT UPPER ARM
LOCATION SIMPLE: RIGHT SHOULDER
LOCATION SIMPLE: RIGHT THIGH
LOCATION SIMPLE: LEFT THUMB
LOCATION SIMPLE: LEFT UPPER ARM
LOCATION SIMPLE: LEFT UPPER BACK
LOCATION SIMPLE: LEFT THIGH
LOCATION SIMPLE: RIGHT UPPER BACK
LOCATION SIMPLE: ABDOMEN
LOCATION SIMPLE: UPPER BACK
LOCATION SIMPLE: LEFT SHOULDER
LOCATION SIMPLE: CHEST

## 2023-07-07 ASSESSMENT — LOCATION ZONE DERM
LOCATION ZONE: FINGER
LOCATION ZONE: ARM
LOCATION ZONE: LEG
LOCATION ZONE: TRUNK

## 2023-07-07 NOTE — PROCEDURE: DEFER
Introduction Text (Please End With A Colon): The following procedure was deferred:
Detail Level: Zone
X Size Of Lesion In Cm (Optional): 0
Procedure To Be Performed At Next Visit: I&D

## 2024-01-03 ENCOUNTER — HOSPITAL ENCOUNTER (OUTPATIENT)
Dept: RADIOLOGY | Facility: HOSPITAL | Age: 79
Discharge: HOME | End: 2024-01-03
Attending: FAMILY MEDICINE
Payer: COMMERCIAL

## 2024-01-03 DIAGNOSIS — Z12.31 ENCOUNTER FOR SCREENING MAMMOGRAM FOR MALIGNANT NEOPLASM OF BREAST: ICD-10-CM

## 2024-01-03 PROCEDURE — 77063 BREAST TOMOSYNTHESIS BI: CPT

## 2025-01-06 ENCOUNTER — TRANSCRIBE ORDERS (OUTPATIENT)
Dept: RADIOLOGY | Facility: HOSPITAL | Age: 80
End: 2025-01-06

## 2025-01-06 ENCOUNTER — HOSPITAL ENCOUNTER (OUTPATIENT)
Dept: RADIOLOGY | Facility: HOSPITAL | Age: 80
Discharge: HOME | End: 2025-01-06
Attending: PHYSICIAN ASSISTANT
Payer: COMMERCIAL

## 2025-01-06 DIAGNOSIS — Z12.31 ENCOUNTER FOR SCREENING MAMMOGRAM FOR MALIGNANT NEOPLASM OF BREAST: Primary | ICD-10-CM

## 2025-01-06 DIAGNOSIS — Z12.31 ENCOUNTER FOR SCREENING MAMMOGRAM FOR MALIGNANT NEOPLASM OF BREAST: ICD-10-CM

## 2025-01-06 PROCEDURE — 77067 SCR MAMMO BI INCL CAD: CPT

## 2025-05-30 ENCOUNTER — TRANSCRIBE ORDERS (OUTPATIENT)
Dept: SCHEDULING | Age: 80
End: 2025-05-30

## 2025-05-30 DIAGNOSIS — M81.8 IDIOPATHIC OSTEOPOROSIS: Primary | ICD-10-CM

## 2025-08-11 ENCOUNTER — HOSPITAL ENCOUNTER (OUTPATIENT)
Dept: RADIOLOGY | Facility: HOSPITAL | Age: 80
Discharge: HOME | End: 2025-08-11
Attending: INTERNAL MEDICINE
Payer: COMMERCIAL

## 2025-08-11 DIAGNOSIS — M81.8 IDIOPATHIC OSTEOPOROSIS: ICD-10-CM

## 2025-08-11 PROCEDURE — 77080 DXA BONE DENSITY AXIAL: CPT

## (undated) DEVICE — MANIFOLD FOUR PORT NEPTUNE

## (undated) DEVICE — GLOVE SURG PROTEXIS PF 8

## (undated) DEVICE — "***USE 138659*** 0 COATED VICRYL UNDYED 3X18"" OS-8 CR"

## (undated) DEVICE — ***USE 110101***ADHESIVE SKIN LIQUIBAND EXCEED .8GM

## (undated) DEVICE — BLADE SAGITTAL

## (undated) DEVICE — SUTURE QUILL 2 PDO RX-2066Q

## (undated) DEVICE — APPLICATOR CHLORAPREP 26ML ORANGE TINT

## (undated) DEVICE — DRESSING AQUACEL ADVA ANTIMCROBIAL 3.5 X 10IN

## (undated) DEVICE — KIT CEMENT MIXING CARTRIDGE AND NOZZLE

## (undated) DEVICE — HOOD STERISHIELD

## (undated) DEVICE — GLOVE SZ 8 LINER PROTEXIS PI BL

## (undated) DEVICE — WRAP COBAN LATEX FREE 4IN STERILE

## (undated) DEVICE — SOLN IRRIG .9%SOD 1000ML

## (undated) DEVICE — COUNTER NEEDLE FOAM BLOCK 1840

## (undated) DEVICE — ADHESIVE SKIN DERMABOND ADVANCED 0.7ML

## (undated) DEVICE — STIRRUP STRAP DISPOSABLE

## (undated) DEVICE — TUBE SUCTION 1/4INX20FT STERILE

## (undated) DEVICE — PAD GROUND ELECTROSURGICAL W/CORD

## (undated) DEVICE — TUBING SMOKE EVAC PENCIL COATED

## (undated) DEVICE — GOWN SURG X-LARGE MICROCOOL

## (undated) DEVICE — Device

## (undated) DEVICE — DRAPE EXTREMITY UNIVERSAL

## (undated) DEVICE — ***USE 149537*** SPANDAGE #6 NET BANDAGE MEDC

## (undated) DEVICE — FLYTE HOOD W/ PEELAWAY

## (undated) DEVICE — MANIFOLD SINGLE PORT NEPTUNE

## (undated) DEVICE — "***USE 138625*** 1 ETHIBOND GREEN 8X18"" CTX CR"

## (undated) DEVICE — BIT DRILL 3.2 X 100

## (undated) DEVICE — SUTURE VICRYL 3-0  J864D CR SH UNDYED

## (undated) DEVICE — GLOVE SZ 7.5 MICRO PROTEXIS LATEX

## (undated) DEVICE — ***LOW USAGE***BLADE SAGITTAL EXTRA WIDE

## (undated) DEVICE — 4-0 STRATAFIX SPIRAL PGA-PCL UNDYED 30CMX3

## (undated) DEVICE — PACK RFID TOTAL KNEE

## (undated) DEVICE — SUCTION 18FR FRAZIER DISPOSABLE

## (undated) DEVICE — SOLN IV 0.9% NSS 1000ML

## (undated) DEVICE — DRESSING SPONGE GAUZE 4X4 STER